# Patient Record
Sex: FEMALE | Race: WHITE | NOT HISPANIC OR LATINO
[De-identification: names, ages, dates, MRNs, and addresses within clinical notes are randomized per-mention and may not be internally consistent; named-entity substitution may affect disease eponyms.]

---

## 2017-04-06 ENCOUNTER — APPOINTMENT (OUTPATIENT)
Dept: CARDIOLOGY | Facility: CLINIC | Age: 62
End: 2017-04-06

## 2017-04-14 ENCOUNTER — APPOINTMENT (OUTPATIENT)
Dept: CARDIOLOGY | Facility: CLINIC | Age: 62
End: 2017-04-14

## 2017-05-04 ENCOUNTER — RESULT REVIEW (OUTPATIENT)
Age: 62
End: 2017-05-04

## 2017-10-08 ENCOUNTER — TRANSCRIPTION ENCOUNTER (OUTPATIENT)
Age: 62
End: 2017-10-08

## 2017-10-27 ENCOUNTER — APPOINTMENT (OUTPATIENT)
Dept: CARDIOLOGY | Facility: CLINIC | Age: 62
End: 2017-10-27
Payer: COMMERCIAL

## 2017-10-27 PROCEDURE — 99214 OFFICE O/P EST MOD 30 MIN: CPT

## 2017-11-20 ENCOUNTER — APPOINTMENT (OUTPATIENT)
Dept: ORTHOPEDIC SURGERY | Facility: CLINIC | Age: 62
End: 2017-11-20
Payer: COMMERCIAL

## 2017-11-20 VITALS
SYSTOLIC BLOOD PRESSURE: 153 MMHG | HEART RATE: 60 BPM | BODY MASS INDEX: 19.99 KG/M2 | HEIGHT: 65 IN | WEIGHT: 120 LBS | DIASTOLIC BLOOD PRESSURE: 76 MMHG

## 2017-11-20 DIAGNOSIS — Z80.9 FAMILY HISTORY OF MALIGNANT NEOPLASM, UNSPECIFIED: ICD-10-CM

## 2017-11-20 PROCEDURE — 99203 OFFICE O/P NEW LOW 30 MIN: CPT

## 2017-11-20 PROCEDURE — 73140 X-RAY EXAM OF FINGER(S): CPT | Mod: 26,F1

## 2018-04-03 ENCOUNTER — APPOINTMENT (OUTPATIENT)
Dept: CARDIOLOGY | Facility: CLINIC | Age: 63
End: 2018-04-03
Payer: MEDICARE

## 2018-04-05 ENCOUNTER — APPOINTMENT (OUTPATIENT)
Dept: CARDIOLOGY | Facility: CLINIC | Age: 63
End: 2018-04-05
Payer: MEDICARE

## 2018-04-05 PROCEDURE — 93306 TTE W/DOPPLER COMPLETE: CPT

## 2018-04-05 PROCEDURE — 93880 EXTRACRANIAL BILAT STUDY: CPT

## 2018-04-06 PROCEDURE — 93224 XTRNL ECG REC UP TO 48 HRS: CPT

## 2018-04-17 ENCOUNTER — APPOINTMENT (OUTPATIENT)
Dept: CARDIOLOGY | Facility: CLINIC | Age: 63
End: 2018-04-17
Payer: MEDICARE

## 2018-04-17 ENCOUNTER — RECORD ABSTRACTING (OUTPATIENT)
Age: 63
End: 2018-04-17

## 2018-04-17 VITALS
HEART RATE: 58 BPM | SYSTOLIC BLOOD PRESSURE: 122 MMHG | OXYGEN SATURATION: 98 % | BODY MASS INDEX: 19.66 KG/M2 | WEIGHT: 118 LBS | DIASTOLIC BLOOD PRESSURE: 62 MMHG | HEIGHT: 65 IN

## 2018-04-17 DIAGNOSIS — Z87.39 PERSONAL HISTORY OF OTHER DISEASES OF THE MUSCULOSKELETAL SYSTEM AND CONNECTIVE TISSUE: ICD-10-CM

## 2018-04-17 DIAGNOSIS — Z87.09 PERSONAL HISTORY OF OTHER DISEASES OF THE RESPIRATORY SYSTEM: ICD-10-CM

## 2018-04-17 DIAGNOSIS — Z85.828 PERSONAL HISTORY OF OTHER MALIGNANT NEOPLASM OF SKIN: ICD-10-CM

## 2018-04-17 PROCEDURE — 99214 OFFICE O/P EST MOD 30 MIN: CPT

## 2018-04-17 PROCEDURE — 93000 ELECTROCARDIOGRAM COMPLETE: CPT

## 2018-05-07 ENCOUNTER — RESULT REVIEW (OUTPATIENT)
Age: 63
End: 2018-05-07

## 2018-10-19 ENCOUNTER — RECORD ABSTRACTING (OUTPATIENT)
Age: 63
End: 2018-10-19

## 2018-10-23 ENCOUNTER — APPOINTMENT (OUTPATIENT)
Dept: CARDIOLOGY | Facility: CLINIC | Age: 63
End: 2018-10-23
Payer: MEDICARE

## 2018-10-23 VITALS
WEIGHT: 118 LBS | HEIGHT: 65 IN | HEART RATE: 63 BPM | DIASTOLIC BLOOD PRESSURE: 62 MMHG | BODY MASS INDEX: 19.66 KG/M2 | SYSTOLIC BLOOD PRESSURE: 110 MMHG | OXYGEN SATURATION: 98 %

## 2018-10-23 PROCEDURE — 99214 OFFICE O/P EST MOD 30 MIN: CPT

## 2018-11-06 ENCOUNTER — APPOINTMENT (OUTPATIENT)
Dept: CARDIOLOGY | Facility: CLINIC | Age: 63
End: 2018-11-06
Payer: MEDICARE

## 2018-11-06 PROCEDURE — 93351 STRESS TTE COMPLETE: CPT

## 2018-11-13 ENCOUNTER — RECORD ABSTRACTING (OUTPATIENT)
Age: 63
End: 2018-11-13

## 2018-11-14 ENCOUNTER — APPOINTMENT (OUTPATIENT)
Dept: CARDIOLOGY | Facility: CLINIC | Age: 63
End: 2018-11-14
Payer: MEDICARE

## 2018-11-14 VITALS
BODY MASS INDEX: 19.99 KG/M2 | HEIGHT: 65 IN | OXYGEN SATURATION: 99 % | WEIGHT: 120 LBS | DIASTOLIC BLOOD PRESSURE: 60 MMHG | SYSTOLIC BLOOD PRESSURE: 130 MMHG | HEART RATE: 59 BPM

## 2018-11-14 PROCEDURE — 99214 OFFICE O/P EST MOD 30 MIN: CPT

## 2019-04-01 ENCOUNTER — RESULT CHARGE (OUTPATIENT)
Age: 64
End: 2019-04-01

## 2019-04-11 ENCOUNTER — APPOINTMENT (OUTPATIENT)
Dept: CARDIOLOGY | Facility: CLINIC | Age: 64
End: 2019-04-11
Payer: MEDICARE

## 2019-04-11 PROCEDURE — 93880 EXTRACRANIAL BILAT STUDY: CPT

## 2019-04-11 PROCEDURE — 93306 TTE W/DOPPLER COMPLETE: CPT

## 2019-04-16 PROCEDURE — 93224 XTRNL ECG REC UP TO 48 HRS: CPT

## 2019-04-17 ENCOUNTER — APPOINTMENT (OUTPATIENT)
Dept: CARDIOLOGY | Facility: CLINIC | Age: 64
End: 2019-04-17
Payer: MEDICARE

## 2019-04-17 ENCOUNTER — RECORD ABSTRACTING (OUTPATIENT)
Age: 64
End: 2019-04-17

## 2019-04-17 PROCEDURE — 93351 STRESS TTE COMPLETE: CPT

## 2019-04-22 ENCOUNTER — TRANSCRIPTION ENCOUNTER (OUTPATIENT)
Age: 64
End: 2019-04-22

## 2019-04-24 ENCOUNTER — RECORD ABSTRACTING (OUTPATIENT)
Age: 64
End: 2019-04-24

## 2019-04-24 ENCOUNTER — APPOINTMENT (OUTPATIENT)
Dept: CARDIOLOGY | Facility: CLINIC | Age: 64
End: 2019-04-24
Payer: MEDICARE

## 2019-04-24 VITALS
DIASTOLIC BLOOD PRESSURE: 70 MMHG | SYSTOLIC BLOOD PRESSURE: 112 MMHG | WEIGHT: 118 LBS | BODY MASS INDEX: 19.66 KG/M2 | HEIGHT: 65 IN | HEART RATE: 75 BPM | OXYGEN SATURATION: 95 %

## 2019-04-24 DIAGNOSIS — Q80.9 CONGENITAL ICHTHYOSIS, UNSPECIFIED: ICD-10-CM

## 2019-04-24 DIAGNOSIS — M67.442 GANGLION, LEFT HAND: ICD-10-CM

## 2019-04-24 DIAGNOSIS — R07.89 OTHER CHEST PAIN: ICD-10-CM

## 2019-04-24 PROCEDURE — 99214 OFFICE O/P EST MOD 30 MIN: CPT

## 2019-06-14 ENCOUNTER — EMERGENCY (EMERGENCY)
Facility: HOSPITAL | Age: 64
LOS: 1 days | End: 2019-06-14
Payer: MEDICARE

## 2019-06-14 ENCOUNTER — TRANSCRIPTION ENCOUNTER (OUTPATIENT)
Age: 64
End: 2019-06-14

## 2019-06-14 ENCOUNTER — OUTPATIENT (OUTPATIENT)
Dept: OUTPATIENT SERVICES | Facility: HOSPITAL | Age: 64
LOS: 1 days | End: 2019-06-14

## 2019-06-14 PROCEDURE — 70450 CT HEAD/BRAIN W/O DYE: CPT | Mod: 26

## 2019-06-14 PROCEDURE — 99285 EMERGENCY DEPT VISIT HI MDM: CPT

## 2019-06-14 PROCEDURE — 93010 ELECTROCARDIOGRAM REPORT: CPT

## 2019-06-14 PROCEDURE — 71046 X-RAY EXAM CHEST 2 VIEWS: CPT | Mod: 26

## 2019-06-14 PROCEDURE — 73564 X-RAY EXAM KNEE 4 OR MORE: CPT | Mod: 26,LT

## 2019-06-14 PROCEDURE — 99284 EMERGENCY DEPT VISIT MOD MDM: CPT

## 2019-06-14 PROCEDURE — 72125 CT NECK SPINE W/O DYE: CPT | Mod: 26

## 2019-06-15 ENCOUNTER — OUTPATIENT (OUTPATIENT)
Dept: OUTPATIENT SERVICES | Facility: HOSPITAL | Age: 64
LOS: 1 days | End: 2019-06-15

## 2019-06-18 ENCOUNTER — APPOINTMENT (OUTPATIENT)
Dept: CARDIOLOGY | Facility: CLINIC | Age: 64
End: 2019-06-18
Payer: MEDICARE

## 2019-06-18 ENCOUNTER — NON-APPOINTMENT (OUTPATIENT)
Age: 64
End: 2019-06-18

## 2019-06-18 VITALS
DIASTOLIC BLOOD PRESSURE: 70 MMHG | HEART RATE: 70 BPM | WEIGHT: 120 LBS | BODY MASS INDEX: 20.49 KG/M2 | OXYGEN SATURATION: 98 % | HEIGHT: 64 IN | SYSTOLIC BLOOD PRESSURE: 134 MMHG

## 2019-06-18 PROCEDURE — 99214 OFFICE O/P EST MOD 30 MIN: CPT

## 2019-06-18 PROCEDURE — 93000 ELECTROCARDIOGRAM COMPLETE: CPT | Mod: 59

## 2019-06-18 PROCEDURE — 0296T: CPT | Mod: 59

## 2019-06-18 NOTE — REVIEW OF SYSTEMS
[Palpitations] : palpitations [Shortness Of Breath] : no shortness of breath [Chest Pain] : no chest pain [Dizziness] : no dizziness

## 2019-06-18 NOTE — REASON FOR VISIT
[FreeTextEntry1] : Mrs. Ngo is a 64 year old female that came in today 6-18-19 for hospital f/u. \par She was last seen 4-24-19. \par \par She states on 6-14-19 after exercising on the treadmill x 30minutes she had an episode of a loss of consciousness x a couple of seconds. She does not remember the actual episode of falling. She did not have any prodrome symptoms prior.  \par She was evaluated at [facility] [place] (with no significant findings per patient neg head CT, awaiting records). She did injur her chin (needing stitches)Follow up outpatient was advised to r/o arrhythmia. \par \par She has been exercising x 3 hours a day x 5 days a week for the past 4.5 years. She admits to not hydrating well, which is not unusual for her. \par \par She has a history of transient palpitation,  x years improving over the years (no associated symptoms) and hyperlipidemia. \par She denies any chest pain, sob, dizziness, orthopnea and PND. \par \par Labs/tests\par \par echocardiogram 4-11-19 EF 60% with normal ventricular function, mild MR, mild dilated left atrium, minimal TR minimal Pr. \par \par Stress echocardiogram 4-17-19 patient is 10 minutes reaching 94% of MPHR, revealing normal electrocardiographic response and normal augmentation in left ventricular systolic function\par \par Holter 4-11-19 normal sinus rhythm rate  beats per minute averaging 73bpm with rare to occasional APCs and rare sequential 2-6 beats up to 179bpm, multifocal and frequent  PVCs with rare couplets and a 3 beat ventricular tachycardia noted. No symptoms\par Carotid ultrasound 4-11-19 normal study\par \par Labs 4-17-19 , HDL 69, triglycerides 85\par \par Labs 6-14-19 white blood cell 10, hemoglobin 13.8, hematocrit 40.9, platelets 331, sodium 139 potassium 3.8, BUN 13, creatinine 0.6, magnesium 1.7, AST 18, , troponin <0.010\par \par Knee x-ray 6-14-19 no acute fracture\par \par Chest x-ry 6-14-19 negative study\par \par CT brain without contrast 6-14-19 no evidence of acute intracranial process. \par \par CT spine without contrast 6-14-19 no fracture or subluxation, multilevel degenerative changes posterior disc osteophyte complexes at C4-C5, C5-C6 and C6-C7 which appears was moderate severe spinal canal stenosis\par \par

## 2019-06-18 NOTE — PHYSICAL EXAM
[Normal Appearance] : normal appearance [No Deformities] : no deformities [] : no respiratory distress [Respiration, Rhythm And Depth] : normal respiratory rhythm and effort [Exaggerated Use Of Accessory Muscles For Inspiration] : no accessory muscle use [Heart Rate And Rhythm] : heart rate and rhythm were normal [Heart Sounds] : normal S1 and S2 [Bowel Sounds] : normal bowel sounds [Abdomen Soft] : soft [Abdomen Tenderness] : non-tender [Abnormal Walk] : normal gait [Skin Color & Pigmentation] : normal skin color and pigmentation [Affect] : the affect was normal [Mood] : the mood was normal [FreeTextEntry1] : no edema with pulses noted b/l

## 2019-06-18 NOTE — ASSESSMENT
[FreeTextEntry1] : PLAN 6-18-19\par \par --Syncope. Per patient, no significant findings during hospital visit (requesting records). Echocardiogram 4-11-19 60% with normal ventricular function. Stress echocardiogram 4-17-19, she exercised 10 minutes reaching 94% of MPHR with normal augmentation of LV systolic function. Holter monitor 4-11-19 heart rate  averaging 73bpm with rare to occasional APCs, multifocal and frequent PVCs and 3 beat ventricular tachycardia. Labs as above. We have recommended 2-3 week event monitoring Zio patch for further evaluation of any underlying arrythmias. If any addiotional episodes 911/er and consider loop monitoring. Patient to increase hydration. (requesting labs from ER visit). \par \par -ASCVD risk 6.35%. Discuss at next visit. Patient should follow a strict low trans sat fat diet. \par \par -Sutures on chin. Patient to wait at least 5-7 days before removal. Today is day 4. She will come back for removal this week\par \par -PCP. PAP may 2018 (was told Q 2 years), mammogram 2 weeks ago (followed by gyn), Dexa pt declines any treatment. Patient has a followup within the next 2 months with a new primary care doctor, Dr. Elizalde.\par \par f/u in a couple of days for suture removal and after zeo patch, sooner if changes in symptoms or status. \par \par Sincerely,\par Dr. Valentino\par scribed by Rama Ramos PA-C\par \par

## 2019-06-20 ENCOUNTER — APPOINTMENT (OUTPATIENT)
Dept: CARDIOLOGY | Facility: CLINIC | Age: 64
End: 2019-06-20
Payer: MEDICARE

## 2019-06-20 VITALS
BODY MASS INDEX: 19.99 KG/M2 | HEART RATE: 54 BPM | SYSTOLIC BLOOD PRESSURE: 110 MMHG | HEIGHT: 65 IN | WEIGHT: 120 LBS | DIASTOLIC BLOOD PRESSURE: 64 MMHG | OXYGEN SATURATION: 100 %

## 2019-06-20 DIAGNOSIS — Z48.02 ENCOUNTER FOR REMOVAL OF SUTURES: ICD-10-CM

## 2019-06-20 PROCEDURE — S0630: CPT

## 2019-06-20 PROCEDURE — 99214 OFFICE O/P EST MOD 30 MIN: CPT

## 2019-06-25 ENCOUNTER — OTHER (OUTPATIENT)
Age: 64
End: 2019-06-25

## 2019-06-25 NOTE — REASON FOR VISIT
[FreeTextEntry1] : Mrs. Ngo is a 64 year old female that came in today 6-20-19 for suture removal. \par She was last seen 6-18-19 in hospital follow up.  \par \par As mentioned previously, on 6-14-19 after exercising on the treadmill x 30minutes she had an episode of a loss of consciousness x a couple of seconds. She does not remember the actual episode of falling. She did not have any prodrome symptoms prior.  She did admit to not hydrating well.\par She was evaluated at [facility] [place] (with no significant findings per patient neg head CT, still awaiting records). She did injure her chin (needing stitches)Follow up outpatient was advised to r/o arrhythmia. \par \par She has exercised since our last visit and has felt fine (note; she has been exercising x 3 hours a day x 5 days a week for the past 4.5 years).  \par \par As mentioned previously she has a history of transient palpitations,  x years improving over the years (no associated symptoms) and hyperlipidemia. \par \par She continues to deny any chest pain, sob, dizziness, orthopnea and PND. No additional syncopal episodes since past visit.  \par \par Labs/tests\par \par echocardiogram 4-11-19 EF 60% with normal ventricular function, mild MR, mild dilated left atrium, minimal TR minimal Pr. \par \par Stress echocardiogram 4-17-19 patient is 10 minutes reaching 94% of MPHR, revealing normal electrocardiographic response and normal augmentation in left ventricular systolic function\par \par Holter 4-11-19 normal sinus rhythm rate  beats per minute averaging 73bpm with rare to occasional APCs and rare sequential 2-6 beats up to 179bpm, multifocal and frequent  PVCs with rare couplets and a 3 beat ventricular tachycardia noted. No symptoms\par Carotid ultrasound 4-11-19 normal study\par \par Labs 4-17-19 , HDL 69, triglycerides 85\par \par Labs 6-14-19 white blood cell 10, hemoglobin 13.8, hematocrit 40.9, platelets 331, sodium 139 potassium 3.8, BUN 13, creatinine 0.6, magnesium 1.7, AST 18, , troponin <0.010\par \par Knee x-ray 6-14-19 no acute fracture\par \par Chest x-ry 6-14-19 negative study\par \par CT brain without contrast 6-14-19 no evidence of acute intracranial process. \par \par CT spine without contrast 6-14-19 no fracture or subluxation, multilevel degenerative changes posterior disc osteophyte complexes at C4-C5, C5-C6 and C6-C7 which appears was moderate severe spinal canal stenosis\par \par

## 2019-06-25 NOTE — ASSESSMENT
[FreeTextEntry1] : PLAN 6-20-19\par \par -Sutures on chin. Patient to wait at least 5-7 days before removal. Today is day 6. 6 stiches removed. Healing well. \par \par --Syncope. Per patient, no significant findings during hospital visit (awaiting discharge summary). Echocardiogram 4-11-19 60% with normal ventricular function. Stress echocardiogram 4-17-19, she exercised 10 minutes reaching 94% of MPHR with normal augmentation of LV systolic function. Holter monitor 4-11-19 heart rate  averaging 73bpm with rare to occasional APCs, multifocal and frequent PVCs and 3 beat ventricular tachycardia. Labs as above. Patient has Zio patch in place for further evaluation of any underlying arrythmias. If any additional episodes 911/er and consider loop monitoring. Patient to increase hydration. (requested labs from ER visit). \par \par -ASCVD risk 6.35%. Given risk, without any family hx or known CAD we discussed the role of calcium scoring in determining treatment plan with statins. Patient is in agreement, rx given. Patient should continue to follow a strict low trans sat fat diet. \par \par -PCP. PAP may 2018 (was told Q 2 years), mammogram 2 weeks ago (followed by gyn), Dexa pt declines any treatment. Patient has a followup within the next 2 months with a new primary care doctor, Dr. Elizalde.\par \par f/u after zeo patch, sooner if changes in symptoms or status. \par \par Sincerely,\par  Rama Ramos PA-C\par Patient seen and plan instructed by supervising physician Dr. Valentino\par \par

## 2019-06-25 NOTE — ADDENDUM
[FreeTextEntry1] : append\par \par pt case came up in conversation with Dr. Roman, of whom saw pt in hospital\par He is concerned for anomolous coronary with her h/o syncope during exercise (without prodrome)\par we have ordered a CTA of the coronaries with contrast in addition to a limited echo (cancelled calcium score)\par \par PV is aware. PT will have done prior to next visit. She verbalizes understanding of avoiding exercise at this time--cv\par \par If any changes on RV consider cardiac MRI. \par In f/u discuss option of loop monitoring, pending results as above.

## 2019-06-27 ENCOUNTER — APPOINTMENT (OUTPATIENT)
Dept: CARDIOLOGY | Facility: CLINIC | Age: 64
End: 2019-06-27
Payer: MEDICARE

## 2019-06-27 PROCEDURE — 93306 TTE W/DOPPLER COMPLETE: CPT

## 2019-07-10 PROCEDURE — 0298T: CPT

## 2019-07-16 ENCOUNTER — APPOINTMENT (OUTPATIENT)
Dept: CARDIOLOGY | Facility: CLINIC | Age: 64
End: 2019-07-16
Payer: MEDICARE

## 2019-07-16 ENCOUNTER — RECORD ABSTRACTING (OUTPATIENT)
Age: 64
End: 2019-07-16

## 2019-07-16 VITALS
WEIGHT: 119 LBS | HEART RATE: 81 BPM | OXYGEN SATURATION: 97 % | DIASTOLIC BLOOD PRESSURE: 60 MMHG | HEIGHT: 65 IN | BODY MASS INDEX: 19.83 KG/M2 | SYSTOLIC BLOOD PRESSURE: 120 MMHG

## 2019-07-16 PROCEDURE — 99214 OFFICE O/P EST MOD 30 MIN: CPT

## 2019-07-16 NOTE — PHYSICAL EXAM
[Normal Appearance] : normal appearance [No Deformities] : no deformities [] : no respiratory distress [Respiration, Rhythm And Depth] : normal respiratory rhythm and effort [Exaggerated Use Of Accessory Muscles For Inspiration] : no accessory muscle use [Heart Sounds] : normal S1 and S2 [Bowel Sounds] : normal bowel sounds [Abdomen Soft] : soft [Abnormal Walk] : normal gait [Skin Color & Pigmentation] : normal skin color and pigmentation [Affect] : the affect was normal [Mood] : the mood was normal [FreeTextEntry1] : no sign edema

## 2019-07-16 NOTE — ADDENDUM
[FreeTextEntry1] : Please note the patient was reviewed with ANJELICA Jones.\par I was physically present during the service of the patient\pattie I was directly involved in the management plan and recommendations of care provided to the patient. \par I personally reviewed the history and physical exam and examination as documented by the PA above.\par 07/16/2019\par \par EP eval of ex syncope

## 2019-07-16 NOTE — ASSESSMENT
[FreeTextEntry1] : AYAD JARAMILLO  is a 64 year F  who presents today Jul 16, 2019 with the above history and the following active issues. \par \par Syncope. Per patient, no significant findings during hospital visit. Echocardiogram 4-11-19 60% with normal ventricular function. Stress echocardiogram 4-17-19, she exercised 10 minutes reaching 94% of MPHR with normal augmentation of LV systolic function. Holter monitor 4-11-19 heart rate  averaging 73bpm with rare to occasional APCs, multifocal and frequent PVCs and 3 beat ventricular tachycardia. Labs as above. CTA of the coronary arteries demonstrates no evidence of coronary artery disease and no evidence of anomalous coronary artery.  ZIO patch monitor with a 4 beat run of NSVT and short run of atrial tachycardia.  Recommend electrophysiology evaluation. If any additional episodes 911/er and consider loop monitoring. Patient to increase hydration. (requested labs from ER visit). \par Continue to avoid strenuous exercise\par \par -ASCVD risk 6.35%.  Patient should continue to follow a strict low trans sat fat diet. Calcium score 0\par \par \par Red flag symptoms which would warrant sooner emergent evaluation reviewed with the patient. \par Questions and concerns were addressed and answered. \par \par Sincerely,\par \par Eryn Jones PA-C\par Patients history, testing and plan reviewed with supervising MD: Dr. Gavin Roman \par \par

## 2019-07-29 ENCOUNTER — APPOINTMENT (OUTPATIENT)
Dept: CARDIOLOGY | Facility: CLINIC | Age: 64
End: 2019-07-29

## 2019-07-31 ENCOUNTER — APPOINTMENT (OUTPATIENT)
Dept: ELECTROPHYSIOLOGY | Facility: CLINIC | Age: 64
End: 2019-07-31
Payer: MEDICARE

## 2019-07-31 ENCOUNTER — NON-APPOINTMENT (OUTPATIENT)
Age: 64
End: 2019-07-31

## 2019-07-31 VITALS
WEIGHT: 120 LBS | HEIGHT: 65 IN | HEART RATE: 82 BPM | OXYGEN SATURATION: 98 % | BODY MASS INDEX: 19.99 KG/M2 | SYSTOLIC BLOOD PRESSURE: 142 MMHG | DIASTOLIC BLOOD PRESSURE: 70 MMHG

## 2019-07-31 PROCEDURE — 93000 ELECTROCARDIOGRAM COMPLETE: CPT

## 2019-07-31 PROCEDURE — 99205 OFFICE O/P NEW HI 60 MIN: CPT | Mod: 25

## 2019-07-31 NOTE — HISTORY OF PRESENT ILLNESS
[FreeTextEntry1] : \par \par Dear Dr. Taylor:  I had a pleasure seeing your patient in consultation regarding a prior syncope episode. As you know she is a 64-year-old woman who is very active exercises almost daily has been in good health and has had no significant prior medical history. She has had no prior history of hypertension, diabetes prior heart disease lung disease. There is a questionable history of asthma when she was younger but never had any problems. She has had a history of GE reflux.\par \par The patient was exercising on  June 14, 2019 treadmill for 30 minutes ( usual time - at 4.5 mph) she had an episode of loss of consciousness which was described as a few seconds - she is not actually does not remember the episode and did not seem to have any prodrome. She went to urgent care center and was then referred to the hospital. The patient required stitches in the chin area. She had a negative head CT scan\par \par She has had evaluation which included an echocardiogram performed in June that showed normal ventricle function ejection fraction approximately 60% mild mitral regurgitation,  normal right ventricle function.\par \par She also had a CT of the coronary arteries that did not show any coronary disease. There is no evidence of anomalous coronary circulation.\par \par The patient had a ZIO patch placed for 13 days start an 6/18-7/2/19. It showed that she had  4 beats of a  wide complex tachycardia at 167bpm. \par She also had 4 beats slower tachycardia at 120 beats per minutes suggestive of ventricular. He had occasional bradycardia to 40 beats a minute range ( while sleeping) and  occasional sinus tachycardia at 135 beats per minute.\par \par The patient is a prior cervical spinal canal  stenosis . She is also prior hip surgery left side. \par \par She has no current symptoms no chest pain, dizziness and lightheadedness syncope recurrence, palpitations. She is drinking plenty of fluids now but was not drinking enough fluids prior to the event.

## 2019-07-31 NOTE — PHYSICAL EXAM
[Normal Conjunctiva] : the conjunctiva exhibited no abnormalities [General Appearance - In No Acute Distress] : no acute distress [General Appearance - Well Developed] : well developed [Heart Rate And Rhythm] : heart rate and rhythm were normal [No Oral Pallor] : no oral pallor [Normal Jugular Venous V Waves Present] : normal jugular venous V waves present [Heart Sounds] : normal S1 and S2 [Respiration, Rhythm And Depth] : normal respiratory rhythm and effort [Murmurs] : no murmurs present [Abdomen Soft] : soft [Abdomen Tenderness] : non-tender [Auscultation Breath Sounds / Voice Sounds] : lungs were clear to auscultation bilaterally [Abnormal Walk] : normal gait [Nail Clubbing] : no clubbing of the fingernails [Cyanosis, Localized] : no localized cyanosis [Impaired Insight] : insight and judgment were intact [No Venous Stasis] : no venous stasis

## 2019-07-31 NOTE — DISCUSSION/SUMMARY
[FreeTextEntry1] : The patient had syncope there is unexplained and this occurred while she was in treadmill exercise and in she had no prodrome and was not had no illness preceded.\par \par Coronary CTA showed normal coronary arteries with no anomalous circulation. Her echo showed normal ventricular function.\par \par Of note on the monitor showed 4 beats wide-complex tachycardia that could be ventricular .\par \par It is unclear to me what exactly occurred during her syncopal episode in treadmill. It could have been arrhythmic but also may have been related to blood pressure.\par \par  Because of concern that this could have been an arrhythmia I recommendation would be to consider a more long-term monitor such as an implantable monitor. Should she have recurrent symptoms this would be helpful. She seems reluctant but will call the office when she decides. She was instructed to increased fluids and avoid strenous activity.

## 2019-08-05 ENCOUNTER — OUTPATIENT (OUTPATIENT)
Dept: OUTPATIENT SERVICES | Facility: HOSPITAL | Age: 64
LOS: 1 days | End: 2019-08-05
Payer: MEDICARE

## 2019-08-07 PROCEDURE — 33285 INSJ SUBQ CAR RHYTHM MNTR: CPT

## 2019-08-13 ENCOUNTER — APPOINTMENT (OUTPATIENT)
Dept: CARDIOLOGY | Facility: CLINIC | Age: 64
End: 2019-08-13
Payer: MEDICARE

## 2019-08-13 PROCEDURE — 93285 PRGRMG DEV EVAL SCRMS IP: CPT

## 2019-09-02 NOTE — HISTORY OF PRESENT ILLNESS
[de-identified] : On 6-14-19 after exercising on the treadmill x 30minutes she had an episode of syncope for a couple of seconds. She does not remember the actual episode of falling. She did not have any prodrome symptoms prior. She did admit to not hydrating well.  She did injure her chin requiring stitches.  \par She was evaluated at [facility] [place] (with no significant findings per patient neg head CT, still awaiting records). Follow up outpatient was advised to r/o arrhythmia. \par \par She has exercised since our last visit and has felt fine (note; she has been exercising x 3 hours a day x 5 days a week for the past 4.5 years). \par \par As mentioned previously she has a history of transient palpitations, x years improving over the years (no associated symptoms) and hyperlipidemia.\par \par Evaluated by EP ( Dr. Jay) who recommended implantation of loop recorder.   Presents today for followup.  No further syncopal episodes.

## 2019-09-02 NOTE — PROCEDURE
[See Device Printout] : See device printout [de-identified] : Reveal Linq LNQ11 [de-identified] : [employer] [de-identified] : [id number] [de-identified] : 08/07/2019 [de-identified] : No episodes recorded.  \par Pt has bedside monitor.  She has been enrolled in [employer] website.  Reviewed device usage. \par 32 day summary will be scheduled. \par Wound CDI. [de-identified] : Battery status: Good

## 2019-09-17 ENCOUNTER — APPOINTMENT (OUTPATIENT)
Dept: CARDIOLOGY | Facility: CLINIC | Age: 64
End: 2019-09-17
Payer: MEDICARE

## 2019-09-17 PROCEDURE — 93299: CPT

## 2019-09-17 PROCEDURE — 93298 REM INTERROG DEV EVAL SCRMS: CPT

## 2019-09-18 NOTE — ASSESSMENT
[FreeTextEntry1] : Routine loop recorder remote transmission reviewed and revealed no events. No changes at this time. F/U remote in 32 days. DEACON.

## 2019-10-16 ENCOUNTER — APPOINTMENT (OUTPATIENT)
Dept: CARDIOLOGY | Facility: CLINIC | Age: 64
End: 2019-10-16

## 2019-10-17 ENCOUNTER — OUTPATIENT (OUTPATIENT)
Dept: OUTPATIENT SERVICES | Facility: HOSPITAL | Age: 64
LOS: 1 days | End: 2019-10-17

## 2019-10-22 ENCOUNTER — APPOINTMENT (OUTPATIENT)
Dept: CARDIOLOGY | Facility: CLINIC | Age: 64
End: 2019-10-22
Payer: MEDICARE

## 2019-10-22 PROCEDURE — 93298 REM INTERROG DEV EVAL SCRMS: CPT

## 2019-10-22 PROCEDURE — 93299: CPT

## 2019-10-22 NOTE — ASSESSMENT
[FreeTextEntry1] : Remote LNq Summary\par \par 10-22-19 \par Viewed lnq summary 8-7-19 to 9-7-19.\par No events were noted.\par  Next summary on pa schedule 32days--cv

## 2019-11-26 ENCOUNTER — APPOINTMENT (OUTPATIENT)
Dept: CARDIOLOGY | Facility: CLINIC | Age: 64
End: 2019-11-26
Payer: MEDICARE

## 2019-11-26 PROCEDURE — 93298 REM INTERROG DEV EVAL SCRMS: CPT

## 2019-11-26 PROCEDURE — 93299: CPT

## 2019-11-26 NOTE — ASSESSMENT
[FreeTextEntry1] : Remote LNq Summary\par \par \par 11-26-19 \par Viewed lnq summary 9-7-19 to 10-8-19. \par No events were noted.\par  Next summary on pa schedule 32days-cv

## 2019-11-27 PROBLEM — Q80.9 ICHTHYOSIS: Status: ACTIVE | Noted: 2018-10-23

## 2020-01-03 ENCOUNTER — APPOINTMENT (OUTPATIENT)
Dept: CARDIOLOGY | Facility: CLINIC | Age: 65
End: 2020-01-03
Payer: MEDICARE

## 2020-01-03 PROCEDURE — 93298 REM INTERROG DEV EVAL SCRMS: CPT

## 2020-01-03 PROCEDURE — G2066: CPT

## 2020-01-06 NOTE — ASSESSMENT
[FreeTextEntry1] : Remote LNq Summary\par \par 1-3-20\par Viewed lnq summary 10-8-19 to 11-8-19. \par No events were noted. \par Next summary on pa schedule 32days-cv

## 2020-02-07 ENCOUNTER — APPOINTMENT (OUTPATIENT)
Dept: CARDIOLOGY | Facility: CLINIC | Age: 65
End: 2020-02-07
Payer: MEDICARE

## 2020-02-07 PROCEDURE — 93298 REM INTERROG DEV EVAL SCRMS: CPT

## 2020-02-07 PROCEDURE — G2066: CPT

## 2020-02-07 NOTE — ASSESSMENT
[FreeTextEntry1] : Remote LNq summary\par \par 2-7-20 \par Viewed lnq summary 11-8-19 to 12-9-19. \par No events were noted. \par Next summary on pa schedule 32days-cv

## 2020-03-09 ENCOUNTER — APPOINTMENT (OUTPATIENT)
Dept: CARDIOLOGY | Facility: CLINIC | Age: 65
End: 2020-03-09
Payer: MEDICARE

## 2020-03-09 DIAGNOSIS — Z87.898 PERSONAL HISTORY OF OTHER SPECIFIED CONDITIONS: ICD-10-CM

## 2020-03-09 PROCEDURE — G2066: CPT

## 2020-03-09 PROCEDURE — 93298 REM INTERROG DEV EVAL SCRMS: CPT

## 2020-03-09 NOTE — ASSESSMENT
[FreeTextEntry1] : Remote Lnq Summary\par \par 3-9-20 \par Viewed lnq summary 12-9-19 to 1-9-19. \par No events were noted. \par Next summary on pa schedule 32days-cv

## 2020-03-11 ENCOUNTER — APPOINTMENT (OUTPATIENT)
Dept: CARDIOLOGY | Facility: CLINIC | Age: 65
End: 2020-03-11
Payer: MEDICARE

## 2020-03-11 VITALS
HEIGHT: 65 IN | BODY MASS INDEX: 19.99 KG/M2 | WEIGHT: 120 LBS | OXYGEN SATURATION: 98 % | DIASTOLIC BLOOD PRESSURE: 60 MMHG | SYSTOLIC BLOOD PRESSURE: 110 MMHG | HEART RATE: 68 BPM

## 2020-03-11 PROCEDURE — 99214 OFFICE O/P EST MOD 30 MIN: CPT

## 2020-03-11 RX ORDER — VIT C/E/ZN/COPPR/LUTEIN/ZEAXAN 250MG-90MG
CAPSULE ORAL DAILY
Refills: 0 | Status: ACTIVE | COMMUNITY

## 2020-03-11 RX ORDER — TURMERIC ROOT EXTRACT 500 MG
TABLET ORAL
Refills: 0 | Status: DISCONTINUED | COMMUNITY
End: 2020-03-11

## 2020-03-12 NOTE — HISTORY OF PRESENT ILLNESS
[FreeTextEntry1] : AYAD JARAMILLO  is a 65 year old  F\par \par History of premature beats, arthritis, NSVT, mild VHD\par There is no prior history of a clinical myocardial infarction, coronary revascularization. \par There is no history of symptomatic congestive heart failure rheumatic heart disease or atrial fibrillation.\par \par Previously seen for episode of syncope.  She wass working out on the treadmill at the gym and the next thing she knew she was on the floor.  There was brief transient loss of consciousness.  She had repair of laceration and sent to the emergency room.  Seen in [facility] June 2019\par There was no reported prodrome.  \par Specifically no symptoms of palpitations lightheadedness or nausea.  \par There was no tongue biting or incontinence.  \par She was not confused afterwards.  \par There were no new medications or clear-cut precipitating factors.  \par Ruled out for myocardial infarction.  \par At follow-up visit longer-term monitoring and non invasive testing was recommended.\par \par Baseline active, no limitations\par There is no exertional chest pain, pressure or discomfort. \par There is no significant dyspnea on exertion or orthopnea. \par There are no symptomatic palpitations.\par \par Prior Holter monitor from April 2019 describes PVCs with NSVT to 3 beats \par Echocardiogram April 2019  normal left ventricular function mild mitral regurgitation \par Stress echo April 2019 no ischemia\par Carotid Doppler normal study \par Blood work April 2019 total cholesterol 209 with LDL of 122 hemoglobin 12.5 \par EKG demonstrates sinus rhythm with poor R wave progression \par Cardiac CTA with 0 calcium score there is no significant atherosclerosis there was aortic atherosclerosis\par Zio Patch demonstrated NSVT to 4 beats there were no sustained dysrhythmias underwent implantable loop recorder August 2019 \par Loop recorder interrogations have not demonstrated any clinical events\par

## 2020-03-12 NOTE — ASSESSMENT
[FreeTextEntry1] : Syncope, trauamtic, brief, on treadmill 6/19\par NSVT\par Premature beats\par Mild VHD\par HL\par Ao asc\par \par Follow up ILR\par Follow-up blood work has been requested \par Increase hydration\par

## 2020-05-26 ENCOUNTER — APPOINTMENT (OUTPATIENT)
Dept: CARDIOLOGY | Facility: CLINIC | Age: 65
End: 2020-05-26
Payer: MEDICARE

## 2020-05-26 PROCEDURE — G2066: CPT

## 2020-05-26 PROCEDURE — 93298 REM INTERROG DEV EVAL SCRMS: CPT

## 2020-05-26 NOTE — ASSESSMENT
[FreeTextEntry1] : lnq Remote Summary\par 1-9-20 to 2-9-20\par 2-9-20 to 3-11-20\par \par No events\par \par Next summary 32 days

## 2020-06-30 ENCOUNTER — APPOINTMENT (OUTPATIENT)
Dept: CARDIOLOGY | Facility: CLINIC | Age: 65
End: 2020-06-30
Payer: MEDICARE

## 2020-06-30 PROCEDURE — G2066: CPT

## 2020-06-30 PROCEDURE — 93298 REM INTERROG DEV EVAL SCRMS: CPT

## 2020-06-30 NOTE — ASSESSMENT
[FreeTextEntry1] : Remote LNq Summary\par \par 6-30-20 \par Viewed lnq summary 3-11-20 to 4-11-20. \par No events were noted.\par  Next summary on pa schedule 32days-cv\par \par apt with SD due sept, tasked FD

## 2020-08-03 ENCOUNTER — APPOINTMENT (OUTPATIENT)
Dept: ELECTROPHYSIOLOGY | Facility: CLINIC | Age: 65
End: 2020-08-03
Payer: MEDICARE

## 2020-08-03 ENCOUNTER — NON-APPOINTMENT (OUTPATIENT)
Age: 65
End: 2020-08-03

## 2020-08-03 VITALS
TEMPERATURE: 98.5 F | OXYGEN SATURATION: 96 % | HEART RATE: 66 BPM | BODY MASS INDEX: 19.49 KG/M2 | DIASTOLIC BLOOD PRESSURE: 60 MMHG | HEIGHT: 65 IN | WEIGHT: 117 LBS | SYSTOLIC BLOOD PRESSURE: 124 MMHG

## 2020-08-03 PROCEDURE — 99213 OFFICE O/P EST LOW 20 MIN: CPT

## 2020-08-03 PROCEDURE — 93000 ELECTROCARDIOGRAM COMPLETE: CPT

## 2020-08-03 NOTE — PHYSICAL EXAM
[General Appearance - In No Acute Distress] : no acute distress [General Appearance - Well Developed] : well developed [Normal Conjunctiva] : the conjunctiva exhibited no abnormalities [No Oral Pallor] : no oral pallor [Normal Jugular Venous V Waves Present] : normal jugular venous V waves present [Auscultation Breath Sounds / Voice Sounds] : lungs were clear to auscultation bilaterally [Respiration, Rhythm And Depth] : normal respiratory rhythm and effort [Heart Rate And Rhythm] : heart rate and rhythm were normal [Heart Sounds] : normal S1 and S2 [Murmurs] : no murmurs present [Abdomen Soft] : soft [Abdomen Tenderness] : non-tender [Abnormal Walk] : normal gait [Nail Clubbing] : no clubbing of the fingernails [No Venous Stasis] : no venous stasis [Cyanosis, Localized] : no localized cyanosis [Impaired Insight] : insight and judgment were intact

## 2020-08-07 ENCOUNTER — APPOINTMENT (OUTPATIENT)
Dept: CARDIOLOGY | Facility: CLINIC | Age: 65
End: 2020-08-07
Payer: MEDICARE

## 2020-08-07 PROCEDURE — 93298 REM INTERROG DEV EVAL SCRMS: CPT

## 2020-08-07 PROCEDURE — G2066: CPT

## 2020-08-07 NOTE — ASSESSMENT
[FreeTextEntry1] : Remote LNQ Summary\par \par 8-7-20\par Viewed lnq summary 4-11-20 to 5-12-20, 5-12-20 to 6-12-20 and 6-12-20 to 7-17-20 (approved by admin)\par No events were noted. \par Pt was seen by THEODORA 8-3-20 (note pending)\par Next summary on pa schedule 32d.

## 2020-08-16 NOTE — HISTORY OF PRESENT ILLNESS
[FreeTextEntry1] : \par Patient seen in follow-up evaluation for prior SVT noted on implantable loop monitor.  She is feeling well denies any symptoms of palpitations, dizziness, lightheadedness or any recurrence of syncope.\par \par ILR from July 27, 2020 showed an episode of  bpm.  She was asymptomatic.  It was an abrupt episode with onset and termination.  RR intervals were regular but P waves were not identified.\par \par She has had no prior history of hypertension, diabetes prior heart disease lung disease. There is a questionable history of asthma when she was younger but never had any problems. She has had a history of GE reflux.\par \par Previously: The patient was exercising on  June 14, 2019 treadmill for 30 minutes ( usual time - at 4.5 mph) she had an episode of loss of consciousness which was described as a few seconds - she is not actually does not remember the episode and did not seem to have any prodrome. She went to urgent care center and was then referred to the hospital. The patient required stitches in the chin area. She had a negative head CT scan\par \par She has had evaluation which included an echocardiogram performed in June that showed normal ventricle function ejection fraction approximately 60% mild mitral regurgitation,  normal right ventricle function.\par \par She also had a CT of the coronary arteries that did not show any coronary disease. There is no evidence of anomalous coronary circulation.\par \par The patient had a ZIO patch placed for 13 days start an 6/18-7/2/19. It showed that she had  4 beats of a  wide complex tachycardia at 167bpm. \par She also had 4 beats slower tachycardia at 120 beats per minutes suggestive of ventricular. He had occasional bradycardia to 40 beats a minute range ( while sleeping) and  occasional sinus tachycardia at 135 beats per minute.\par \par The patient is a prior cervical spinal canal  stenosis . She is also prior hip surgery left side. \par \par

## 2020-08-16 NOTE — DISCUSSION/SUMMARY
[FreeTextEntry1] : \par The patient is feeling well and has not had any recurrence of syncope or wide-complex tachycardia.  She had an episode of SVT while doing routine activities for 9 seconds at 176 bpm.  We will continue to monitor her and if she should have recurrent episodes we will consider starting her on a beta-blocker.\par

## 2020-08-19 ENCOUNTER — RESULT REVIEW (OUTPATIENT)
Age: 65
End: 2020-08-19

## 2020-09-10 ENCOUNTER — APPOINTMENT (OUTPATIENT)
Dept: CARDIOLOGY | Facility: CLINIC | Age: 65
End: 2020-09-10
Payer: MEDICARE

## 2020-09-10 PROCEDURE — 93298 REM INTERROG DEV EVAL SCRMS: CPT

## 2020-09-10 PROCEDURE — G2066: CPT

## 2020-09-10 NOTE — ASSESSMENT
[FreeTextEntry1] : \par Remote LNq Summary\par \par 9-10-20 \par Viewed lnq summary 7-17-20 to 8-21-20 \par Tachy event r/w BS and read as SVT x 9 sec with avg v [id number].\par  PT was performing her usual mild to moderate exercise at that time and was completely asymptomatic. \par Given her history of syncope and new onset SVT, recommended followup with EP within a week to discuss.\par  Pt saw RJ on 8/3 (if recurrent consider bb, no changes at that time).\par  Next summary on pa sched 32d-cv

## 2020-09-30 ENCOUNTER — APPOINTMENT (OUTPATIENT)
Dept: CARDIOLOGY | Facility: CLINIC | Age: 65
End: 2020-09-30
Payer: MEDICARE

## 2020-09-30 VITALS
DIASTOLIC BLOOD PRESSURE: 66 MMHG | BODY MASS INDEX: 19.66 KG/M2 | RESPIRATION RATE: 16 BRPM | WEIGHT: 118 LBS | HEIGHT: 65 IN | HEART RATE: 71 BPM | SYSTOLIC BLOOD PRESSURE: 118 MMHG | TEMPERATURE: 99.1 F | OXYGEN SATURATION: 97 %

## 2020-09-30 DIAGNOSIS — E78.00 PURE HYPERCHOLESTEROLEMIA, UNSPECIFIED: ICD-10-CM

## 2020-09-30 PROCEDURE — 99214 OFFICE O/P EST MOD 30 MIN: CPT

## 2020-10-01 PROBLEM — E78.00 HYPERCHOLESTEREMIA: Status: ACTIVE | Noted: 2018-11-14

## 2020-10-01 NOTE — ASSESSMENT
[FreeTextEntry1] : \par Syncope, trauamtic, brief, on treadmill 6/19\par NSVT\par Premature beats\par Mild VHD\par HL\par Ao asc\par \par Follow up ILR, EPS\par ? CMR\par Increase hydration\par

## 2020-10-01 NOTE — HISTORY OF PRESENT ILLNESS
[FreeTextEntry1] : AYAD JARAMILLO  is a 65 year old  F\par \par History of premature beats, arthritis, NSVT, mild VHD\par There is no prior history of a clinical myocardial infarction, coronary revascularization. \par There is no history of symptomatic congestive heart failure rheumatic heart disease or atrial fibrillation.\par \par Previously seen for episode of syncope.  She wass working out on the treadmill at the gym and the next thing she knew she was on the floor.  There was brief transient loss of consciousness.  She had repair of laceration and sent to the emergency room.  Seen in [facility] June 2019\par There was no reported prodrome.  \par Specifically no symptoms of palpitations lightheadedness or nausea.  \par There was no tongue biting or incontinence.  \par She was not confused afterwards.  \par There were no new medications or clear-cut precipitating factors.  \par Ruled out for myocardial infarction.  \par At follow-up visit longer-term monitoring and non invasive testing was recommended.\par \par Baseline active, no limitations\par There is no exertional chest pain, pressure or discomfort. \par There is no significant dyspnea on exertion or orthopnea. \par There are no symptomatic palpitations.\par \par Back to sport time.  Takes exercise classes.  Does weights and aerobics.  \par She had an episode of PSVT.  At that time she is aware of her heart rate being elevated.  She saw electrophysiology.  The symptoms related to dehydration.  \par \par Blood work March 2020 hemoglobin 12.2, potassium 3.9, creatinine 0.5, , total cholesterol 196, TSH 1.6 \par \par last EKG demonstrates sinus bradycardia with poor R wave progression\par Prior Holter monitor from April 2019 describes PVCs with NSVT to 3 beats \par Echocardiogram April 2019  normal left ventricular function mild mitral regurgitation \par Stress echo April 2019 no ischemia\par Carotid Doppler normal study \par Cardiac CTA with 0 calcium score there is no significant atherosclerosis there was aortic atherosclerosis\par Zio Patch demonstrated NSVT to 4 beats there were no sustained dysrhythmias underwent implantable loop recorder August 2019 \par Loop recorder interrogations have not demonstrated any clinical events

## 2020-10-13 ENCOUNTER — APPOINTMENT (OUTPATIENT)
Dept: CARDIOLOGY | Facility: CLINIC | Age: 65
End: 2020-10-13
Payer: MEDICARE

## 2020-10-13 PROCEDURE — G2066: CPT

## 2020-10-13 PROCEDURE — 93298 REM INTERROG DEV EVAL SCRMS: CPT

## 2020-10-13 NOTE — ASSESSMENT
[FreeTextEntry1] : Remote LNQ Summary\par \par 10-13-20 \par Viewed lnq summary 8-21-20 to 9-28-20. \par No events were noted. \par Next summary on pa schedule 32d-cv

## 2020-11-19 ENCOUNTER — APPOINTMENT (OUTPATIENT)
Dept: CARDIOLOGY | Facility: CLINIC | Age: 65
End: 2020-11-19
Payer: MEDICARE

## 2020-11-19 DIAGNOSIS — Z87.898 PERSONAL HISTORY OF OTHER SPECIFIED CONDITIONS: ICD-10-CM

## 2020-11-19 PROCEDURE — 93298 REM INTERROG DEV EVAL SCRMS: CPT

## 2020-11-19 PROCEDURE — G2066: CPT

## 2020-11-19 NOTE — ASSESSMENT
[FreeTextEntry1] : Remote LNQ summary\par \par \par 11-19-20 \par Viewed lnq summary 9-28-20 to 11-2-20. \par No events were noted. \par seen by THEODORA 8-3-20\par NExt summary on pa schedule 32d-cv

## 2021-01-05 ENCOUNTER — APPOINTMENT (OUTPATIENT)
Dept: CARDIOLOGY | Facility: CLINIC | Age: 66
End: 2021-01-05
Payer: MEDICARE

## 2021-01-05 PROCEDURE — 93298 REM INTERROG DEV EVAL SCRMS: CPT

## 2021-01-05 PROCEDURE — G2066: CPT

## 2021-01-05 NOTE — ASSESSMENT
[FreeTextEntry1] : Remote LNQ summary 1/5/21\par \par Viewed lnq summary 11/2/20 to 12/8/20  \par No events were noted. \par Next summary 32.

## 2021-02-11 ENCOUNTER — APPOINTMENT (OUTPATIENT)
Dept: CARDIOLOGY | Facility: CLINIC | Age: 66
End: 2021-02-11
Payer: MEDICARE

## 2021-02-11 PROCEDURE — G2066: CPT

## 2021-02-11 PROCEDURE — 93298 REM INTERROG DEV EVAL SCRMS: CPT

## 2021-02-12 ENCOUNTER — NON-APPOINTMENT (OUTPATIENT)
Age: 66
End: 2021-02-12

## 2021-03-16 ENCOUNTER — APPOINTMENT (OUTPATIENT)
Dept: CARDIOLOGY | Facility: CLINIC | Age: 66
End: 2021-03-16
Payer: MEDICARE

## 2021-03-16 ENCOUNTER — NON-APPOINTMENT (OUTPATIENT)
Age: 66
End: 2021-03-16

## 2021-03-16 PROCEDURE — 93298 REM INTERROG DEV EVAL SCRMS: CPT

## 2021-03-16 PROCEDURE — G2066: CPT

## 2021-04-19 ENCOUNTER — NON-APPOINTMENT (OUTPATIENT)
Age: 66
End: 2021-04-19

## 2021-04-19 ENCOUNTER — APPOINTMENT (OUTPATIENT)
Dept: CARDIOLOGY | Facility: CLINIC | Age: 66
End: 2021-04-19
Payer: MEDICARE

## 2021-04-19 PROCEDURE — 93298 REM INTERROG DEV EVAL SCRMS: CPT

## 2021-04-19 PROCEDURE — G2066: CPT

## 2021-05-24 ENCOUNTER — APPOINTMENT (OUTPATIENT)
Dept: CARDIOLOGY | Facility: CLINIC | Age: 66
End: 2021-05-24
Payer: MEDICARE

## 2021-05-24 ENCOUNTER — NON-APPOINTMENT (OUTPATIENT)
Age: 66
End: 2021-05-24

## 2021-05-24 PROCEDURE — G2066: CPT

## 2021-05-24 PROCEDURE — 93298 REM INTERROG DEV EVAL SCRMS: CPT

## 2021-06-07 ENCOUNTER — NON-APPOINTMENT (OUTPATIENT)
Age: 66
End: 2021-06-07

## 2021-06-09 ENCOUNTER — APPOINTMENT (OUTPATIENT)
Dept: CARDIOLOGY | Facility: CLINIC | Age: 66
End: 2021-06-09
Payer: MEDICARE

## 2021-06-09 VITALS
SYSTOLIC BLOOD PRESSURE: 122 MMHG | HEART RATE: 67 BPM | BODY MASS INDEX: 19.64 KG/M2 | OXYGEN SATURATION: 99 % | DIASTOLIC BLOOD PRESSURE: 66 MMHG | WEIGHT: 118 LBS | TEMPERATURE: 97.3 F

## 2021-06-09 PROCEDURE — 93291 INTERROG DEV EVAL SCRMS IP: CPT

## 2021-06-28 ENCOUNTER — APPOINTMENT (OUTPATIENT)
Dept: CARDIOLOGY | Facility: CLINIC | Age: 66
End: 2021-06-28

## 2021-06-28 ENCOUNTER — NON-APPOINTMENT (OUTPATIENT)
Age: 66
End: 2021-06-28

## 2021-08-03 ENCOUNTER — NON-APPOINTMENT (OUTPATIENT)
Age: 66
End: 2021-08-03

## 2021-08-03 ENCOUNTER — APPOINTMENT (OUTPATIENT)
Dept: CARDIOLOGY | Facility: CLINIC | Age: 66
End: 2021-08-03
Payer: MEDICARE

## 2021-08-03 PROCEDURE — 93298 REM INTERROG DEV EVAL SCRMS: CPT

## 2021-08-03 PROCEDURE — G2066: CPT

## 2021-09-07 ENCOUNTER — NON-APPOINTMENT (OUTPATIENT)
Age: 66
End: 2021-09-07

## 2021-09-07 ENCOUNTER — APPOINTMENT (OUTPATIENT)
Dept: CARDIOLOGY | Facility: CLINIC | Age: 66
End: 2021-09-07
Payer: MEDICARE

## 2021-09-07 PROCEDURE — G2066: CPT

## 2021-09-07 PROCEDURE — 93298 REM INTERROG DEV EVAL SCRMS: CPT

## 2021-10-11 ENCOUNTER — NON-APPOINTMENT (OUTPATIENT)
Age: 66
End: 2021-10-11

## 2021-10-12 ENCOUNTER — APPOINTMENT (OUTPATIENT)
Dept: CARDIOLOGY | Facility: CLINIC | Age: 66
End: 2021-10-12
Payer: MEDICARE

## 2021-10-12 PROCEDURE — 93298 REM INTERROG DEV EVAL SCRMS: CPT

## 2021-10-12 PROCEDURE — G2066: CPT

## 2021-10-26 ENCOUNTER — APPOINTMENT (OUTPATIENT)
Dept: CARDIOLOGY | Facility: CLINIC | Age: 66
End: 2021-10-26
Payer: MEDICARE

## 2021-10-26 ENCOUNTER — NON-APPOINTMENT (OUTPATIENT)
Age: 66
End: 2021-10-26

## 2021-10-26 VITALS
HEART RATE: 82 BPM | OXYGEN SATURATION: 98 % | BODY MASS INDEX: 19.16 KG/M2 | DIASTOLIC BLOOD PRESSURE: 70 MMHG | HEIGHT: 65 IN | WEIGHT: 115 LBS | SYSTOLIC BLOOD PRESSURE: 126 MMHG | TEMPERATURE: 98.6 F

## 2021-10-26 DIAGNOSIS — Z00.00 ENCOUNTER FOR GENERAL ADULT MEDICAL EXAMINATION W/OUT ABNORMAL FINDINGS: ICD-10-CM

## 2021-10-26 PROCEDURE — 99213 OFFICE O/P EST LOW 20 MIN: CPT

## 2021-10-26 PROCEDURE — 93000 ELECTROCARDIOGRAM COMPLETE: CPT

## 2021-10-26 RX ORDER — CIDER VINEGAR 300 MG
1000 TABLET ORAL
Refills: 0 | Status: DISCONTINUED | COMMUNITY
End: 2021-10-26

## 2021-10-26 NOTE — REASON FOR VISIT
[Arrhythmia/ECG Abnorrmalities] : arrhythmia/ECG abnormalities [Structural Heart and Valve Disease] : structural heart and valve disease [Other: ____] : [unfilled]

## 2021-10-31 NOTE — HISTORY OF PRESENT ILLNESS
[FreeTextEntry1] : AYAD JARAMILLO  is a 66 year old  F\par History of premature beats, arthritis, NSVT, mild VHD\par There is no prior history of a clinical myocardial infarction, coronary revascularization. \par There is no history of symptomatic congestive heart failure rheumatic heart disease or atrial fibrillation.\par \par Previously seen for episode of syncope during exercising on treadmill June 2019. There was brief transient loss of consciousness. She was not confused afterwards. She had repair of laceration and sent to the emergency room in [facility].\par There was no reported prodrome.  \par Specifically no symptoms of palpitations, lightheadedness, nausea, tongue biting, or incontinence.  \par There were no new medications or clear-cut precipitating factors.  \par Ruled out MI.\par \par At baseline she is extremely active with no limitations. \par There is no exertional chest pain, pressure or discomfort. \par There is no significant dyspnea on exertion or orthopnea. \par There are no symptomatic palpitations.\par \par No new symptoms. Takes 2 hours of classes and 1 hour of walking at the gym. \par Reports incident in June 2021 of dehydration causing near syncope while working out at home.\par Prior PSVT due to dehydration. \par \par Loop recorder demonstrates no events brief episode of atrial tachycardia \par EKG demonstrates sinus rhythm with poor R wave progression    \par \par Blood work March 2020 hemoglobin 12.2, potassium 3.9, creatinine 0.5, , total cholesterol 196, TSH 1.6 \par \par Prior Holter monitor from April 2019 describes PVCs with NSVT to 3 beats \par Echocardiogram April 2019  normal left ventricular function mild mitral regurgitation \par Stress echo April 2019 no ischemia\par Carotid Doppler normal study \par Cardiac CTA with 0 calcium score there is no significant atherosclerosis there was aortic atherosclerosis\par Zio Patch demonstrated NSVT to 4 beats there were no sustained dysrhythmias underwent implantable loop recorder August 2019 \par \par

## 2021-10-31 NOTE — ASSESSMENT
[FreeTextEntry1] : Syncope, trauamtic, brief, on treadmill 6/19\par NSVT PSVT\par Premature beats\par Mild VHD\par HL\par Ao asc\par \par Patient aware of importance of increasing hydration. Patient will continue current exercise regiment. \par Fasting blood work requested. Echo requested. \par \par Will call with the results. Will continue to monitor loop recorder accordingly. \par \par Instructed to call if any new symptoms\par Oct 26 2021 11:45AM\par

## 2021-11-08 ENCOUNTER — APPOINTMENT (OUTPATIENT)
Dept: CARDIOLOGY | Facility: CLINIC | Age: 66
End: 2021-11-08
Payer: MEDICARE

## 2021-11-08 PROCEDURE — 93306 TTE W/DOPPLER COMPLETE: CPT

## 2021-11-15 ENCOUNTER — NON-APPOINTMENT (OUTPATIENT)
Age: 66
End: 2021-11-15

## 2021-11-15 ENCOUNTER — APPOINTMENT (OUTPATIENT)
Dept: CARDIOLOGY | Facility: CLINIC | Age: 66
End: 2021-11-15
Payer: MEDICARE

## 2021-11-15 PROCEDURE — G2066: CPT | Mod: NC

## 2021-11-15 PROCEDURE — 93298 REM INTERROG DEV EVAL SCRMS: CPT

## 2021-12-19 ENCOUNTER — NON-APPOINTMENT (OUTPATIENT)
Age: 66
End: 2021-12-19

## 2021-12-20 ENCOUNTER — APPOINTMENT (OUTPATIENT)
Dept: CARDIOLOGY | Facility: CLINIC | Age: 66
End: 2021-12-20
Payer: MEDICARE

## 2021-12-20 PROCEDURE — G2066: CPT

## 2021-12-20 PROCEDURE — 93298 REM INTERROG DEV EVAL SCRMS: CPT

## 2022-01-24 ENCOUNTER — NON-APPOINTMENT (OUTPATIENT)
Age: 67
End: 2022-01-24

## 2022-01-24 ENCOUNTER — APPOINTMENT (OUTPATIENT)
Dept: CARDIOLOGY | Facility: CLINIC | Age: 67
End: 2022-01-24
Payer: MEDICARE

## 2022-01-24 PROCEDURE — 93298 REM INTERROG DEV EVAL SCRMS: CPT

## 2022-01-24 PROCEDURE — G2066: CPT

## 2022-02-28 ENCOUNTER — NON-APPOINTMENT (OUTPATIENT)
Age: 67
End: 2022-02-28

## 2022-02-28 ENCOUNTER — APPOINTMENT (OUTPATIENT)
Dept: CARDIOLOGY | Facility: CLINIC | Age: 67
End: 2022-02-28
Payer: MEDICARE

## 2022-02-28 PROCEDURE — 93298 REM INTERROG DEV EVAL SCRMS: CPT

## 2022-02-28 PROCEDURE — G2066: CPT

## 2022-03-18 ENCOUNTER — OUTPATIENT (OUTPATIENT)
Dept: OUTPATIENT SERVICES | Facility: HOSPITAL | Age: 67
LOS: 1 days | End: 2022-03-18

## 2022-03-18 DIAGNOSIS — E53.8 DEFICIENCY OF OTHER SPECIFIED B GROUP VITAMINS: ICD-10-CM

## 2022-03-18 DIAGNOSIS — M62.81 MUSCLE WEAKNESS (GENERALIZED): ICD-10-CM

## 2022-03-18 DIAGNOSIS — R55 SYNCOPE AND COLLAPSE: ICD-10-CM

## 2022-03-18 DIAGNOSIS — E55.9 VITAMIN D DEFICIENCY, UNSPECIFIED: ICD-10-CM

## 2022-03-18 DIAGNOSIS — R53.83 OTHER FATIGUE: ICD-10-CM

## 2022-04-01 ENCOUNTER — NON-APPOINTMENT (OUTPATIENT)
Age: 67
End: 2022-04-01

## 2022-04-04 ENCOUNTER — APPOINTMENT (OUTPATIENT)
Dept: CARDIOLOGY | Facility: CLINIC | Age: 67
End: 2022-04-04
Payer: MEDICARE

## 2022-04-04 ENCOUNTER — NON-APPOINTMENT (OUTPATIENT)
Age: 67
End: 2022-04-04

## 2022-04-04 PROCEDURE — G2066: CPT

## 2022-04-04 PROCEDURE — 93298 REM INTERROG DEV EVAL SCRMS: CPT

## 2022-05-09 ENCOUNTER — NON-APPOINTMENT (OUTPATIENT)
Age: 67
End: 2022-05-09

## 2022-05-09 ENCOUNTER — APPOINTMENT (OUTPATIENT)
Dept: CARDIOLOGY | Facility: CLINIC | Age: 67
End: 2022-05-09
Payer: MEDICARE

## 2022-05-09 PROCEDURE — G2066: CPT

## 2022-05-09 PROCEDURE — 93298 REM INTERROG DEV EVAL SCRMS: CPT

## 2022-06-14 ENCOUNTER — APPOINTMENT (OUTPATIENT)
Dept: CARDIOLOGY | Facility: CLINIC | Age: 67
End: 2022-06-14
Payer: MEDICARE

## 2022-06-14 ENCOUNTER — NON-APPOINTMENT (OUTPATIENT)
Age: 67
End: 2022-06-14

## 2022-06-14 PROCEDURE — 93298 REM INTERROG DEV EVAL SCRMS: CPT

## 2022-06-14 PROCEDURE — G2066: CPT

## 2022-06-28 ENCOUNTER — NON-APPOINTMENT (OUTPATIENT)
Age: 67
End: 2022-06-28

## 2022-06-28 LAB
ALBUMIN SERPL ELPH-MCNC: 4.3 G/DL
ALP BLD-CCNC: 72 U/L
ALT SERPL-CCNC: 13 U/L
ANION GAP SERPL CALC-SCNC: 12 MMOL/L
AST SERPL-CCNC: 18 U/L
BILIRUB SERPL-MCNC: 1.2 MG/DL
BUN SERPL-MCNC: 10 MG/DL
CALCIUM SERPL-MCNC: 9 MG/DL
CHLORIDE SERPL-SCNC: 104 MMOL/L
CHOLEST SERPL-MCNC: 145 MG/DL
CK SERPL-CCNC: 76 U/L
CO2 SERPL-SCNC: 25 MMOL/L
CREAT SERPL-MCNC: 0.65 MG/DL
EGFR: 95 ML/MIN/1.73M2
GLUCOSE SERPL-MCNC: 91 MG/DL
HDLC SERPL-MCNC: 66 MG/DL
LDLC SERPL CALC-MCNC: 68 MG/DL
NONHDLC SERPL-MCNC: 79 MG/DL
POTASSIUM SERPL-SCNC: 3.9 MMOL/L
PROT SERPL-MCNC: 6.1 G/DL
SODIUM SERPL-SCNC: 141 MMOL/L
TRIGL SERPL-MCNC: 60 MG/DL

## 2022-07-18 ENCOUNTER — NON-APPOINTMENT (OUTPATIENT)
Age: 67
End: 2022-07-18

## 2022-07-18 ENCOUNTER — APPOINTMENT (OUTPATIENT)
Dept: CARDIOLOGY | Facility: CLINIC | Age: 67
End: 2022-07-18

## 2022-07-18 PROCEDURE — 93298 REM INTERROG DEV EVAL SCRMS: CPT

## 2022-07-18 PROCEDURE — G2066: CPT

## 2022-08-22 ENCOUNTER — NON-APPOINTMENT (OUTPATIENT)
Age: 67
End: 2022-08-22

## 2022-08-22 ENCOUNTER — APPOINTMENT (OUTPATIENT)
Dept: CARDIOLOGY | Facility: CLINIC | Age: 67
End: 2022-08-22

## 2022-08-22 PROCEDURE — G2066: CPT

## 2022-08-22 PROCEDURE — 93298 REM INTERROG DEV EVAL SCRMS: CPT

## 2022-09-20 ENCOUNTER — RESULT REVIEW (OUTPATIENT)
Age: 67
End: 2022-09-20

## 2022-09-22 ENCOUNTER — NON-APPOINTMENT (OUTPATIENT)
Age: 67
End: 2022-09-22

## 2022-09-22 ENCOUNTER — APPOINTMENT (OUTPATIENT)
Dept: CARDIOLOGY | Facility: CLINIC | Age: 67
End: 2022-09-22

## 2022-09-22 PROCEDURE — 93298 REM INTERROG DEV EVAL SCRMS: CPT

## 2022-09-22 PROCEDURE — G2066: CPT

## 2022-10-18 ENCOUNTER — NON-APPOINTMENT (OUTPATIENT)
Age: 67
End: 2022-10-18

## 2022-10-18 ENCOUNTER — APPOINTMENT (OUTPATIENT)
Dept: CARDIOLOGY | Facility: CLINIC | Age: 67
End: 2022-10-18

## 2022-10-18 VITALS
TEMPERATURE: 98.4 F | SYSTOLIC BLOOD PRESSURE: 132 MMHG | OXYGEN SATURATION: 98 % | BODY MASS INDEX: 19.33 KG/M2 | WEIGHT: 116 LBS | HEART RATE: 78 BPM | DIASTOLIC BLOOD PRESSURE: 64 MMHG | HEIGHT: 65 IN

## 2022-10-18 DIAGNOSIS — R94.31 ABNORMAL ELECTROCARDIOGRAM [ECG] [EKG]: ICD-10-CM

## 2022-10-18 DIAGNOSIS — I47.29 OTHER VENTRICULAR TACHYCARDIA: ICD-10-CM

## 2022-10-18 DIAGNOSIS — I34.0 NONRHEUMATIC MITRAL (VALVE) INSUFFICIENCY: ICD-10-CM

## 2022-10-18 PROCEDURE — 93000 ELECTROCARDIOGRAM COMPLETE: CPT

## 2022-10-18 PROCEDURE — 99214 OFFICE O/P EST MOD 30 MIN: CPT

## 2022-10-18 RX ORDER — ELECTROLYTES/DEXTROSE
SOLUTION, ORAL ORAL
Refills: 0 | Status: DISCONTINUED | COMMUNITY
End: 2022-10-18

## 2022-10-27 ENCOUNTER — NON-APPOINTMENT (OUTPATIENT)
Age: 67
End: 2022-10-27

## 2022-10-27 ENCOUNTER — APPOINTMENT (OUTPATIENT)
Dept: CARDIOLOGY | Facility: CLINIC | Age: 67
End: 2022-10-27

## 2022-10-27 PROCEDURE — 93298 REM INTERROG DEV EVAL SCRMS: CPT

## 2022-10-27 PROCEDURE — G2066: CPT

## 2022-11-09 NOTE — REASON FOR VISIT
[FreeTextEntry1] : Mrs. Ngo is a 64 year old female that came in today July 16, 2019 in clinical follow-up. Last seen on June 20, 2019. There have been no further episodes of syncope or near syncope.. Completed Cardiac CTA and ZIO patch monitor. \par No new symptoms. \par \par As mentioned previously, on 6-14-19 after exercising on the treadmill x 30minutes she had an episode of a loss of consciousness x a couple of seconds. She does not remember the actual episode of falling. She did not have any prodrome symptoms prior.  She did admit to not hydrating well.\par She was evaluated at [facility] (with no significant findings per patient neg head CT, still awaiting records). She did injure her chin (needing stitches)Follow up outpatient was advised to r/o arrhythmia. \par \par She has exercised since our last visit and has felt fine (note; she has been exercising x 3 hours a day x 5 days a week for the past 4.5 years).  \par \par As mentioned previously she has a history of transient palpitations,  x years improving over the years (no associated symptoms) and hyperlipidemia. \par \par She continues to deny any chest pain, sob, dizziness, orthopnea and PND. No additional syncopal episodes since past visit.  \par \par Labs/tests\par \par ZIO patch monitor 4 beat run of NSVT, minimal heart rate 40 beats per minute, max heart rate 135, average heart rate 74. Short run atach\par \par CT of the coronary arteries demonstrates no evidence of coronary artery disease.  Right coronary artery, left main, originating in the typical fashion.  No evidence of anomalous coronary artery.\par \par echocardiogram 4-11-19 EF 60% with normal ventricular function, mild MR, mild dilated left atrium, minimal TR minimal Pr.  Normal RV size and function\par \par Stress echocardiogram 4-17-19 patient is 10 minutes reaching 94% of MPHR, revealing normal electrocardiographic response and normal augmentation in left ventricular systolic function\par \par Holter 4-11-19 normal sinus rhythm rate  beats per minute averaging 73bpm with rare to occasional APCs and rare sequential 2-6 beats up to 179bpm, multifocal and frequent  PVCs with rare couplets and a 3 beat ventricular tachycardia noted. No symptoms\par Carotid ultrasound 4-11-19 normal study\par \par Labs 4-17-19 , HDL 69, triglycerides 85\par \par Labs 6-14-19 white blood cell 10, hemoglobin 13.8, hematocrit 40.9, platelets 331, sodium 139 potassium 3.8, BUN 13, creatinine 0.6, magnesium 1.7, AST 18, , troponin <0.010\par \par Knee x-ray 6-14-19 no acute fracture\par \par Chest x-ry 6-14-19 negative study\par \par CT brain without contrast 6-14-19 no evidence of acute intracranial process. \par \par CT spine without contrast 6-14-19 no fracture or subluxation, multilevel degenerative changes posterior disc osteophyte complexes at C4-C5, C5-C6 and C6-C7 which appears was moderate severe spinal canal stenosis\par \par 
Sh  Spoke with Dental, Dr. Garcias  Has agreed to see pt as well as to contact pediatrics for recommendations   Per dental, recommends pt starting amoxicillin

## 2022-11-11 ENCOUNTER — APPOINTMENT (OUTPATIENT)
Dept: ELECTROPHYSIOLOGY | Facility: CLINIC | Age: 67
End: 2022-11-11

## 2022-11-11 VITALS
TEMPERATURE: 97.1 F | OXYGEN SATURATION: 96 % | WEIGHT: 116 LBS | BODY MASS INDEX: 19.33 KG/M2 | HEIGHT: 65 IN | HEART RATE: 63 BPM

## 2022-11-11 DIAGNOSIS — I47.1 SUPRAVENTRICULAR TACHYCARDIA: ICD-10-CM

## 2022-11-11 PROCEDURE — 99214 OFFICE O/P EST MOD 30 MIN: CPT

## 2022-11-11 NOTE — PHYSICAL EXAM
[General Appearance - Well Developed] : well developed [General Appearance - In No Acute Distress] : no acute distress [Normal Conjunctiva] : the conjunctiva exhibited no abnormalities [No Oral Pallor] : no oral pallor [Normal Jugular Venous V Waves Present] : normal jugular venous V waves present [Respiration, Rhythm And Depth] : normal respiratory rhythm and effort [Auscultation Breath Sounds / Voice Sounds] : lungs were clear to auscultation bilaterally [Heart Rate And Rhythm] : heart rate and rhythm were normal [Heart Sounds] : normal S1 and S2 [Murmurs] : no murmurs present [Abdomen Soft] : soft [Abdomen Tenderness] : non-tender [Abnormal Walk] : normal gait [Nail Clubbing] : no clubbing of the fingernails [Cyanosis, Localized] : no localized cyanosis [No Venous Stasis] : no venous stasis [Impaired Insight] : insight and judgment were intact

## 2022-11-26 PROBLEM — I47.1 PSVT (PAROXYSMAL SUPRAVENTRICULAR TACHYCARDIA): Status: ACTIVE | Noted: 2020-10-01

## 2022-11-26 NOTE — HISTORY OF PRESENT ILLNESS
[FreeTextEntry1] : \par The patient is a 67-year-old woman who is seen in follow-up evaluation because of AV block that was noted on her ILR for October 26, 2022.  This occurred around 11 AM.  At that time the patient explains that she was getting anesthesia for a colonoscopy.  She was not told anything after the colonoscopy terms of whether there are heartbeat issues.  Post procedure she had no issues and post discharged to follow-up today she has not had any symptoms of dizziness, lightheadedness, syncope or presyncope.\par \par ILR interrogated today: 2 pauses noted and episode of SVTs.  \par \par She has had a prior history of SVTs and the last episode being outside May 22, 2022 and June 13, 2022 episodes lasting 5 seconds and 26 seconds approximately 180 bpm.  \par \par She is currently not on any AV blocker.\par She has had no prior history of hypertension, diabetes prior heart disease lung disease. There is a questionable history of asthma when she was younger but never had any problems. She has had a history of GE reflux.\par \par Previously: The patient was exercising on  June 14, 2019 treadmill for 30 minutes ( usual time - at 4.5 mph) she had an episode of loss of consciousness which was described as a few seconds - she is not actually does not remember the episode and did not seem to have any prodrome. She went to urgent care center and was then referred to the hospital. The patient required stitches in the chin area. She had a negative head CT scan\par \par She has had evaluation which included an echocardiogram performed in June that showed normal ventricle function ejection fraction approximately 60% mild mitral regurgitation,  normal right ventricle function.\par \par She also had a CT of the coronary arteries that did not show any coronary disease. There is no evidence of anomalous coronary circulation.\par \par The patient had a ZIO patch placed for 13 days start an 6/18-7/2/19. It showed that she had  4 beats of a  wide complex tachycardia at 167bpm. \par She also had 4 beats slower tachycardia at 120 beats per minutes suggestive of ventricular. He had occasional bradycardia to 40 beats a minute range ( while sleeping) and  occasional sinus tachycardia at 135 beats per minute.\par \par The patient is a prior cervical spinal canal  stenosis . She is also prior hip surgery left side. \par \par

## 2022-11-26 NOTE — REVIEW OF SYSTEMS
[Cough] : cough [Feeling Fatigued] : not feeling fatigued [Blurry Vision] : no blurred vision [Dyspnea on exertion] : not dyspnea during exertion [Chest Discomfort] : no chest discomfort [Palpitations] : no palpitations [Orthopnea] : no orthopnea [Abdominal Pain] : no abdominal pain [Dizziness] : no dizziness [FreeTextEntry7] : acid reflux/nasal drip

## 2022-11-26 NOTE — DISCUSSION/SUMMARY
[FreeTextEntry1] : \par This is a patient has had a prior history of SVT.  She is currently on no medication for control of SVT.  She has been doing well with exception of 2 very short episodes in May and June 2022.  She was not aware of these episodes in terms of symptoms.  On the day of her colonoscopy that time of her given anesthesia it was noted that she had 6 and 7 seconds pause with AV block.  She has not had any recurrent episodes since then.  The patient is not on any beta-blocker.\par \par The plan is to continue to monitor her remote and should we see recurrent AV block we will reassess her. She does not need pacing currently.  My concern more is her SVT and if this were to become more  frequent we would have to rediscuss the option of catheter ablation.\par \par The patient expressed understanding and verbalized understanding regarding the plans and recommendations.\par

## 2022-11-30 ENCOUNTER — APPOINTMENT (OUTPATIENT)
Dept: CARDIOLOGY | Facility: CLINIC | Age: 67
End: 2022-11-30

## 2022-11-30 ENCOUNTER — NON-APPOINTMENT (OUTPATIENT)
Age: 67
End: 2022-11-30

## 2022-11-30 PROCEDURE — 93298 REM INTERROG DEV EVAL SCRMS: CPT

## 2022-11-30 PROCEDURE — G2066: CPT

## 2022-12-14 NOTE — HISTORY OF PRESENT ILLNESS
[FreeTextEntry1] : AYAD JARAMILLO  is a 67 year old  F\par \par History of premature beats, arthritis, NSVT, mild VHD, HL\par There is no prior history of a clinical myocardial infarction, coronary revascularization. \par There is no history of symptomatic congestive heart failure rheumatic heart disease or atrial fibrillation.\par \par Previously seen for episode of syncope during exercising on treadmill June 2019. There was brief transient loss of consciousness. She was not confused afterwards. She had repair of laceration and sent to the emergency room in [facility].\par There was no reported prodrome.  \par Specifically no symptoms of palpitations, lightheadedness, nausea, tongue biting, or incontinence.  \par There were no new medications or clear-cut precipitating factors.  \par Ruled out MI.\par \par At baseline she is extremely active with no limitations. \par There is no exertional chest pain, pressure or discomfort. \par There is no significant dyspnea on exertion or orthopnea. \par There are no symptomatic palpitations.\par \par No new symptoms. Takes 2 hours of classes and 1 hour of walking at the gym. \par Reports incident in June 2021 of dehydration causing near syncope while working out at home.\par Prior PSVT due to dehydration. \par \par 2021 total cholesterol 221  creatinine 0.7 potassium 4.7 TSH LP(a) BNP magnesium A1c within normal limits.  \par echocardiogram demonstrates normal left ventricular function.  Minimal valvular heart disease. \par Loop recorder checked previously demonstrated an episode of SVT.  Nonsustained lasted less than 30 seconds.\par blood work March 2022 total cholesterol 216  creatinine 0.6 hemoglobin 12.6 \par \par started low-dose preventive statin therapy \par June 2022 total cholesterol 145 LDL 68. \par EKG demonstrates sinus rhythm with poor R wave progression    \par Prior Holter monitor from April 2019 describes PVCs with NSVT to 3 beats \par Echocardiogram April 2019  normal left ventricular function mild mitral regurgitation \par Stress echo April 2019 no ischemia\par Carotid Doppler normal study \par Cardiac CTA with 0 calcium score there is no significant atherosclerosis there was aortic atherosclerosis\par Zio Patch demonstrated NSVT to 4 beats there were no sustained dysrhythmias underwent implantable loop recorder August 2019 \par

## 2022-12-14 NOTE — ASSESSMENT
[FreeTextEntry1] : \par Syncope, trauamtic, brief, on treadmill 6/19\par NSVT PSVT\par Premature beats\par Mild VHD\par Ao asc\par \par Hyperlipidemia.  Intermediate ASCVD risk score \par Excellent response to low-dose statin therapy \par \par Patient aware of importance of increasing hydration. Patient will continue current exercise regiment. \par \par monitor loop recorder

## 2022-12-14 NOTE — ADDENDUM
[FreeTextEntry1] : EKG obtained to assist in the diagnosis and management of assisted problem(s).\par

## 2023-01-04 ENCOUNTER — NON-APPOINTMENT (OUTPATIENT)
Age: 68
End: 2023-01-04

## 2023-01-04 ENCOUNTER — APPOINTMENT (OUTPATIENT)
Dept: CARDIOLOGY | Facility: CLINIC | Age: 68
End: 2023-01-04
Payer: MEDICARE

## 2023-01-04 PROCEDURE — 93298 REM INTERROG DEV EVAL SCRMS: CPT

## 2023-01-04 PROCEDURE — G2066: CPT

## 2023-02-03 NOTE — PHYSICAL EXAM
CANDIDA = base of support)  Firm Surface: Normal Appearance] : normal appearance with 3 stairs to enter, a walk in shower, grab bars, and shower bench. Patient completed functional mobility with supervision initially, but progressed to modified independent with the cane with assist x1. Requires minimal-max assist with dressing tasks, but will have assistance from neighbor and  while recovering at home. No further questions or ADL needs at this time. OT currently recommends outpatient therapy per MD protocol once medically cleared.            Discharge Recommendations:  Recommendation for Discharge Location: OT WI: Home without therapy needs  Recommendation for Discharge Support: OT [place]: Intermittent assist daily, Assistance with IADLs  PT/OT Mobility Equipment for Discharge: has cane, 2ww  PT/OT ADL Equipment for Discharge: has shower chair, grab bars, and shower bench        • Personal Occupations Profile Affected: bathing/showering, toileting/toilet hygiene, lower body dressing, care of others, health management/maintenance, detention preparation/adjustment, social participation community      • Clinical decision making: Low - Patient has few limitations (1-3), comorbidities and/or complexities, as noted in problem focused assessment noted above, that impact their occupational profile.  Resulting in few treatment options and no task modification consistent with low clinical decision making complexity.    Education:   - Present and ready to learn: patient  - Present and not ready to learn: patient    Patient at End of Session:   Location: in chair  Safety measures: call light within reach  Handoff to: nurse    PLAN  Suggestions for next session as indicated:     Frequency Comments: D/C OT        Interventions: ADL retraining, balance, activity tolerance training, compensatory technique education, equipment eval/education, functional transfer training, upper extremity strengthening/ROM, therapeutic exercise and patient education  Agreement to plan and goals: patient agrees with goals and  [No Deformities] : no deformities no respiratory distress [Respiration, Rhythm And Depth] : normal respiratory rhythm and effort [Exaggerated Use Of Accessory Muscles For Inspiration] : no accessory muscle use [Heart Sounds] : normal S1 and S2 [Bowel Sounds] : normal bowel sounds [Abdomen Soft] : soft [Abnormal Walk] : normal gait [Affect] : the affect was normal [Skin Color & Pigmentation] : normal skin color and pigmentation [Mood] : the mood was normal [FreeTextEntry1] : 1/6 justo

## 2023-02-06 ENCOUNTER — APPOINTMENT (OUTPATIENT)
Dept: CARDIOLOGY | Facility: CLINIC | Age: 68
End: 2023-02-06
Payer: MEDICARE

## 2023-02-06 ENCOUNTER — NON-APPOINTMENT (OUTPATIENT)
Age: 68
End: 2023-02-06

## 2023-02-06 PROCEDURE — 93298 REM INTERROG DEV EVAL SCRMS: CPT

## 2023-02-06 PROCEDURE — G2066: CPT

## 2023-02-20 ENCOUNTER — NON-APPOINTMENT (OUTPATIENT)
Age: 68
End: 2023-02-20

## 2023-02-21 ENCOUNTER — EMERGENCY (EMERGENCY)
Facility: HOSPITAL | Age: 68
LOS: 1 days | Discharge: HOME | End: 2023-02-21
Attending: EMERGENCY MEDICINE | Admitting: EMERGENCY MEDICINE
Payer: MEDICARE

## 2023-02-21 VITALS
HEART RATE: 71 BPM | RESPIRATION RATE: 16 BRPM | OXYGEN SATURATION: 98 % | TEMPERATURE: 98 F | SYSTOLIC BLOOD PRESSURE: 146 MMHG | DIASTOLIC BLOOD PRESSURE: 65 MMHG

## 2023-02-21 PROCEDURE — 99284 EMERGENCY DEPT VISIT MOD MDM: CPT | Mod: FS

## 2023-02-21 NOTE — ED ADULT TRIAGE NOTE - CHIEF COMPLAINT QUOTE
pt is a Transfer from [facility], tripped and fell in a parking garbage, hit her face. arrives with lac to the lower lip, sutured. 2 chipped front teeth, lac inside the bottom lip not sutured transferred to [facility] to get sutured. pt in no distress. denies any headache, dizziness, blurry vision, n/v. not on blood thinners. hx. HLD

## 2023-02-22 VITALS
DIASTOLIC BLOOD PRESSURE: 73 MMHG | RESPIRATION RATE: 18 BRPM | OXYGEN SATURATION: 99 % | TEMPERATURE: 98 F | SYSTOLIC BLOOD PRESSURE: 163 MMHG | HEART RATE: 85 BPM

## 2023-02-22 RX ORDER — AMOXICILLIN 250 MG/5ML
1 SUSPENSION, RECONSTITUTED, ORAL (ML) ORAL
Qty: 14 | Refills: 0
Start: 2023-02-22 | End: 2023-02-28

## 2023-02-22 RX ORDER — CHLORHEXIDINE GLUCONATE 213 G/1000ML
15 SOLUTION TOPICAL
Qty: 210 | Refills: 0
Start: 2023-02-22 | End: 2023-02-28

## 2023-02-22 NOTE — ED ADULT NURSE NOTE - HOW OFTEN DO YOU HAVE A DRINK CONTAINING ALCOHOL?
Spoke with patient who reports limited mobility to shoulder. Got progressively worse after completion of prednisone course last month. Has physical therapy appointment on Friday and wants medication to help her get through this. Advised a return office visit for repeat evaluation. Has follow up with Dr. Wiggins next week. Opening today that patient is able to make so scheduled this appointment.   Never

## 2023-02-22 NOTE — ED ADULT NURSE NOTE - CHIEF COMPLAINT QUOTE
pt is a Transfer from [facility], tripped and fell in a parking garbage, hit her face. arrives with lac to the lower lip, sutured. 2 chipped front teeth, lac inside the bottom lip not sutured transferred to [facility] to get sutured. pt in no distress. denies any headache, dizziness, blurry vision, n/v. not on blood thinners. hx. HLD

## 2023-02-22 NOTE — ED PROVIDER NOTE - ENMT, MLM
Airway patent, Nasal mucosa clear. Mouth with normal mucosa. Throat has no vesicles, no oropharyngeal exudates and uvula is midline.  Repaired lac to lower lip.   Gapping laceration below lower gum line where it meets the lip exposing tooth roots.

## 2023-02-22 NOTE — ED PROVIDER NOTE - NSFOLLOWUPINSTRUCTIONS_ED_ALL_ED_FT
Take antibiotics as prescribed     Use Peridex rinse two times daily     Follow up in oral surgery clinic in one week    Laceration    A laceration is a cut that goes through all of the layers of the skin and into the tissue that is right under the skin. Some lacerations heal on their own. Others need to be closed with skin adhesive strips, skin glue, stitches (sutures), or staples. Proper laceration care minimizes the risk of infection and helps the laceration to heal better.  If non-absorbable stitches or staples have been placed, they must be taken out within the time frame instructed by your healthcare provider.    SEEK IMMEDIATE MEDICAL CARE IF YOU HAVE ANY OF THE FOLLOWING SYMPTOMS: swelling around the wound, worsening pain, drainage from the wound, red streaking going away from your wound, inability to move finger or toe near the laceration, or discoloration of skin near the laceration.

## 2023-02-22 NOTE — ED ADULT NURSE NOTE - NS_SISCREENINGSR_GEN_ALL_ED
-Patient with a recurrent issue of knee pain despite normal xray done  I am recommending she use Voltaren 1% gel to the affected knee and use of Tramadol  Checked PDMP and patient never received Tramadol before  Recommended weight loss as this can also cause strain on her left knee  Advised to possibly start on physical therapy  Referral given to ortho as this has been a recurrent issue for patient  Negative

## 2023-02-22 NOTE — CONSULT NOTE ADULT - SUBJECTIVE AND OBJECTIVE BOX
AYAD JARAMILLO 68y  [id number]    HPI:   68y Female with past medical history significant for HCL and prior syncopal episodes presents to [facility] ED s/p fall as a transfer from [facility]. OMFS was consulted for vestibular lower lip laceration. CT Maxillofacial was obtained which showed no facial fractures.     PMH: Syncope, HCL  Meds: Crestor  PSH: Loop recorder  Allergies:   Neomycin (Rash)    SOCIAL HISTORY:   ETOH use: Denies   Tobacco use:  Denies   Recreational drug use: Denies       Review of Systems: Patient denies loss of consciousness, fever, chills, nausea, vomiting, headache, CP, SOB, cough, palpitations, blurred vision/double vision, dysphagia, dyspnea.      Physical Exam:   Gen: AAOx3, NAD   Head: Edema/tenderness to palpation on lower lip, lacerations on intraoral/extraoral lip, extraoral lip laceration repaired at [facility] no ecchymosis    Eyes: EOMI, PERRL, visual acuity intact, no diplopia, supra/infra orbital rims intact, no subconjunctival heme, no telecanthus, no exophthalmos   Ears: Gross hearing intact, no heme appreciated, condylar head palpated bilaterally, no otorrhea    Nose: No septal hematoma/asymmetry, no epistaxis bilaterally, no rhinorrhea, no abrasions present, no lacerations,  Malar: No malar depression, no CN V-2 paresthesia   Throat: No LAD, supple, trachea midline  Extraoral/Intraoral Exam: ELISSA: 40, dentition grossly intact, occlusion is stable and reproducible, no CN V-3 paresthesia, no mobility of maxilla/crepitis, TMJ: No clicking/popping     Vitals:   Vital Signs Last 24 Hrs  T(C): 36.9 (22 Feb 2023 05:18), Max: 36.9 (22 Feb 2023 05:18)  T(F): 98.4 (22 Feb 2023 05:18), Max: 98.4 (22 Feb 2023 05:18)  HR: 85 (22 Feb 2023 05:18) (71 - 85)  BP: 163/73 (22 Feb 2023 05:18) (146/65 - 163/73)  BP(mean): --  RR: 18 (22 Feb 2023 05:18) (16 - 18)  SpO2: 99% (22 Feb 2023 05:18) (98% - 99%)    Parameters below as of 22 Feb 2023 05:18  Patient On (Oxygen Delivery Method): room air    Procedure:   Patient was informed of the procedure of an intraoral lip vestibular laceration repair. Discussed the risks benefits and alternatives including doing nothing. Patient elected to proceed with intraoral lip vestibular laceration repair. Consent was signed and placed in patients chart. b/l RIVERA nerve blocks w/ 3.4cc 2% lidocaine w/ 1:100,000 epinephrine was used to achieve adequate anesthesia.  Copious irrigation was used to clean site, 3-0 vicryl sutures for muscle layer and subdermal layer were used. 3-0 chomic gut sutures used to reapproximate mucosal layer.     CT Maxillofacial:  No facial fractures noted

## 2023-02-22 NOTE — ED PROVIDER NOTE - NS ED ATTENDING STATEMENT MOD
This was a shared visit with the BENITA. I reviewed and verified the documentation and independently performed the documented:

## 2023-02-22 NOTE — ED ADULT NURSE REASSESSMENT NOTE - NS ED NURSE REASSESS COMMENT FT1
Pt a&ox4, ambulatory, denies pain at this time. IV removed. Respirations are even and unlabored, no signs of respiratory distress.

## 2023-02-22 NOTE — ED ADULT NURSE NOTE - OBJECTIVE STATEMENT
Pt received to room 17. Pt A&Ox4, ambulatory at baseline. Pt transfer from [facility], states she lost her footing when walking up steps and fell. Pt arrives with sutures to bottom lip but was transferred for OMFS consult for sutures to inside of mouth. No bleeding noted to site. Pt denies any LOC. Denies any headache, dizziness, SOB, CP. IV access established with 20G to L AC. Pt in NAD. Denies blood thinner use. Safety measures in place

## 2023-02-22 NOTE — ED ADULT NURSE NOTE - NSIMPLEMENTINTERV_GEN_ALL_ED
Implemented All Fall Risk Interventions:  [place] to call system. Call bell, personal items and telephone within reach. Instruct patient to call for assistance. Room bathroom lighting operational. Non-slip footwear when patient is off stretcher. Physically safe environment: no spills, clutter or unnecessary equipment. Stretcher in lowest position, wheels locked, appropriate side rails in place. Provide visual cue, wrist band, yellow gown, etc. Monitor gait and stability. Monitor for mental status changes and reorient to person, place, and time. Review medications for side effects contributing to fall risk. Reinforce activity limits and safety measures with patient and family.

## 2023-02-22 NOTE — ED PROVIDER NOTE - ATTENDING APP SHARED VISIT CONTRIBUTION OF CARE
I performed a face-to-face evaluation of the patient and performed a history and physical examination along with the resident or ACP, and/or medical student above.  I agree with the history and physical examination as documented by the resident or ACP, and/or medical student above.  Rosa:  Gen: Alert, NAD  Head: NC, AT,  EOMI, normal lids/conjunctiva  ENT:  normal hearing, patent oropharynx,  upper incisors; sutured lac on lower lip.   Gapping laceration below lower gum line where it meets the lip exposing tooth roots.  Neck: +supple, no tenderness/meningismus/JVD, +Trachea midline  Chest: no chest wall tenderness, equal chest rise  Pulm: Bilateral BS, normal resp effort, no wheeze/stridor/retractions  CV: RRR, no M/R/G, +dist pulses  Abd: +BS, soft, NT/ND  Rectal: deferred  Mskel: no edema/erythema/cyanosis  Skin: no rash  Neuro: AAOx3, no sensory/motor deficits, CN 2-12 intact

## 2023-02-22 NOTE — CONSULT NOTE ADULT - ASSESSMENT
Assessment and Recommendation:   68y Female with HCL and syncope presented s/p fall with anterior vestibular lip laceration. Laceration repaired bedside by OMFS.    Plan:  - Peridex 15ml BID   - Amoxicillin 500mg TID   - Pain control per ED  - Patient should follow up as an outpatient at [facility] in one week.     [facility]   Address: [id number] [place] [address]  Phone: [phone]    Girma Martinez DDS   Oral and Maxillofacial Surgery   Pager #[phone]  Available on Microsoft Teams

## 2023-02-22 NOTE — ED PROVIDER NOTE - NSFOLLOWUPCLINICS_GEN_ALL_ED_FT
Oral & Maxillofacial Surgery  Department of Dental Medicine  [phone] [place] [address]  Phone: [phone]  Fax: [phone]  Follow Up Time: 7-10 Days

## 2023-02-22 NOTE — ED PROVIDER NOTE - OBJECTIVE STATEMENT
68 y.o. female transfer from [facility] for OMFS 2/2 inner lower lip lac. Pt presented to the ER with laceration to lower lip and chipped tooth from a trip and fall going up a step in parking garage. Pt had CT head, max/face which were grossly normal. Pt found to have lower lip lac which was repair at [facility]. Pt also found to have gapping laceration below lower gum line where it meets the lip exposing tooth roots. OMFS was called and accepted transfer. Pt has no further complaints at this time.

## 2023-02-22 NOTE — ED PROVIDER NOTE - CLINICAL SUMMARY MEDICAL DECISION MAKING FREE TEXT BOX
68 y.o. female transfer from [facility] for OMFS 2/2 inner lower lip lac. Pt presented to the ER with laceration to lower lip and chipped tooth from a trip and fall going up a step in parking garage. Pt had CT head, max/face which were grossly normal. Pt found to have lower lip lac which was repair at [facility]. Pt also found to have gapping laceration below lower gum line where it meets the lip exposing tooth roots. OMFS was called and accepted transfer.    plan  - OMFS consult

## 2023-03-08 ENCOUNTER — NON-APPOINTMENT (OUTPATIENT)
Age: 68
End: 2023-03-08

## 2023-03-09 ENCOUNTER — APPOINTMENT (OUTPATIENT)
Dept: CARDIOLOGY | Facility: CLINIC | Age: 68
End: 2023-03-09
Payer: MEDICARE

## 2023-03-09 PROBLEM — Z78.9 OTHER SPECIFIED HEALTH STATUS: Chronic | Status: ACTIVE | Noted: 2023-02-22

## 2023-03-09 PROCEDURE — G2066: CPT

## 2023-03-09 PROCEDURE — 93298 REM INTERROG DEV EVAL SCRMS: CPT

## 2023-03-29 ENCOUNTER — NON-APPOINTMENT (OUTPATIENT)
Age: 68
End: 2023-03-29

## 2023-04-12 ENCOUNTER — APPOINTMENT (OUTPATIENT)
Dept: CARDIOLOGY | Facility: CLINIC | Age: 68
End: 2023-04-12
Payer: MEDICARE

## 2023-04-12 VITALS
HEART RATE: 75 BPM | SYSTOLIC BLOOD PRESSURE: 132 MMHG | WEIGHT: 113 LBS | HEIGHT: 65 IN | BODY MASS INDEX: 18.83 KG/M2 | DIASTOLIC BLOOD PRESSURE: 50 MMHG | OXYGEN SATURATION: 97 %

## 2023-04-12 DIAGNOSIS — Z45.09 ENCOUNTER FOR ADJUSTMENT AND MANAGEMENT OF OTHER CARDIAC DEVICE: ICD-10-CM

## 2023-04-12 PROCEDURE — 93285 PRGRMG DEV EVAL SCRMS IP: CPT

## 2023-04-12 NOTE — PROCEDURE
[de-identified] : [employer] Linq II [de-identified] : [employer] Linq II [de-identified] : [id number] [de-identified] : 04/24/2023 [de-identified] : Patient presents today for DVC and WCK.\par \par Battery status good.\par \par ILR site healing well without any s/s of infection. No events.\par \par Device confirmed in [facility] 4/12/23.\par \par F/U: DRCs monthly then in office in 1 year.\par Discussed red flag symptoms, which would warrant sooner or emergent medical evaluation.\par Any questions and concerns were addressed and resolved.\par \par Sincerely,\par Carolina Sanchez [facility]-BC\par Patient's history, testing, and plan was reviewed with supervising physician, Dr. Patrick Valentino

## 2023-05-15 ENCOUNTER — APPOINTMENT (OUTPATIENT)
Dept: CARDIOLOGY | Facility: CLINIC | Age: 68
End: 2023-05-15
Payer: MEDICARE

## 2023-05-15 ENCOUNTER — NON-APPOINTMENT (OUTPATIENT)
Age: 68
End: 2023-05-15

## 2023-05-15 PROCEDURE — G2066: CPT

## 2023-05-15 PROCEDURE — 93298 REM INTERROG DEV EVAL SCRMS: CPT

## 2023-05-17 ENCOUNTER — RX RENEWAL (OUTPATIENT)
Age: 68
End: 2023-05-17

## 2023-06-06 ENCOUNTER — NON-APPOINTMENT (OUTPATIENT)
Age: 68
End: 2023-06-06

## 2023-06-16 ENCOUNTER — APPOINTMENT (OUTPATIENT)
Dept: CARDIOLOGY | Facility: CLINIC | Age: 68
End: 2023-06-16
Payer: MEDICARE

## 2023-06-16 ENCOUNTER — NON-APPOINTMENT (OUTPATIENT)
Age: 68
End: 2023-06-16

## 2023-06-16 PROCEDURE — 93298 REM INTERROG DEV EVAL SCRMS: CPT

## 2023-06-16 PROCEDURE — G2066: CPT

## 2023-07-19 NOTE — ED ADULT TRIAGE NOTE - HEART RATE (BEATS/MIN)
It is important to see your primary physician as well as the physicians noted below within the next week to perform a comprehensive medical review.  Call their offices for an appointment as soon as you leave the hospital.  You will also need to see them for renewal of your medications.  If you do not have a primary physician, contact the [facility] Physician Referral Service at [phone].  To obtain your results, you can access the [facility] Patient Portal at [email or web address].  Your medical issues appear to be stable at this time, but if your symptoms recur or worsen, contact your physicians and/or return to the hospital if necessary.  If you encounter any issues or questions with your medication, call your physicians before stopping the medication.  
71

## 2023-07-21 ENCOUNTER — APPOINTMENT (OUTPATIENT)
Dept: CARDIOLOGY | Facility: CLINIC | Age: 68
End: 2023-07-21
Payer: MEDICARE

## 2023-07-21 ENCOUNTER — NON-APPOINTMENT (OUTPATIENT)
Age: 68
End: 2023-07-21

## 2023-07-21 PROCEDURE — G2066: CPT

## 2023-07-21 PROCEDURE — 93298 REM INTERROG DEV EVAL SCRMS: CPT

## 2023-08-24 ENCOUNTER — APPOINTMENT (OUTPATIENT)
Dept: CARDIOLOGY | Facility: CLINIC | Age: 68
End: 2023-08-24
Payer: MEDICARE

## 2023-08-24 ENCOUNTER — NON-APPOINTMENT (OUTPATIENT)
Age: 68
End: 2023-08-24

## 2023-08-24 PROCEDURE — 93298 REM INTERROG DEV EVAL SCRMS: CPT

## 2023-08-24 PROCEDURE — G2066: CPT

## 2023-09-28 ENCOUNTER — APPOINTMENT (OUTPATIENT)
Dept: CARDIOLOGY | Facility: CLINIC | Age: 68
End: 2023-09-28
Payer: MEDICARE

## 2023-09-28 ENCOUNTER — NON-APPOINTMENT (OUTPATIENT)
Age: 68
End: 2023-09-28

## 2023-09-28 PROCEDURE — 93298 REM INTERROG DEV EVAL SCRMS: CPT

## 2023-09-28 PROCEDURE — G2066: CPT

## 2023-10-06 ENCOUNTER — LABORATORY RESULT (OUTPATIENT)
Age: 68
End: 2023-10-06

## 2023-10-17 ENCOUNTER — NON-APPOINTMENT (OUTPATIENT)
Age: 68
End: 2023-10-17

## 2023-10-17 ENCOUNTER — APPOINTMENT (OUTPATIENT)
Dept: CARDIOLOGY | Facility: CLINIC | Age: 68
End: 2023-10-17
Payer: MEDICARE

## 2023-10-17 VITALS
DIASTOLIC BLOOD PRESSURE: 64 MMHG | SYSTOLIC BLOOD PRESSURE: 110 MMHG | HEIGHT: 65 IN | WEIGHT: 113 LBS | OXYGEN SATURATION: 97 % | BODY MASS INDEX: 18.83 KG/M2 | HEART RATE: 88 BPM

## 2023-10-17 DIAGNOSIS — I70.0 ATHEROSCLEROSIS OF AORTA: ICD-10-CM

## 2023-10-17 DIAGNOSIS — Z71.89 OTHER SPECIFIED COUNSELING: ICD-10-CM

## 2023-10-17 DIAGNOSIS — E78.5 HYPERLIPIDEMIA, UNSPECIFIED: ICD-10-CM

## 2023-10-17 DIAGNOSIS — R55 SYNCOPE AND COLLAPSE: ICD-10-CM

## 2023-10-17 DIAGNOSIS — Z98.890 OTHER SPECIFIED POSTPROCEDURAL STATES: ICD-10-CM

## 2023-10-17 PROCEDURE — 93000 ELECTROCARDIOGRAM COMPLETE: CPT

## 2023-10-17 PROCEDURE — 99213 OFFICE O/P EST LOW 20 MIN: CPT

## 2023-10-31 ENCOUNTER — NON-APPOINTMENT (OUTPATIENT)
Age: 68
End: 2023-10-31

## 2023-10-31 ENCOUNTER — APPOINTMENT (OUTPATIENT)
Dept: CARDIOLOGY | Facility: CLINIC | Age: 68
End: 2023-10-31
Payer: MEDICARE

## 2023-10-31 PROCEDURE — G2066: CPT | Mod: NC

## 2023-10-31 PROCEDURE — 93298 REM INTERROG DEV EVAL SCRMS: CPT

## 2023-12-05 ENCOUNTER — APPOINTMENT (OUTPATIENT)
Dept: CARDIOLOGY | Facility: CLINIC | Age: 68
End: 2023-12-05
Payer: MEDICARE

## 2023-12-05 ENCOUNTER — NON-APPOINTMENT (OUTPATIENT)
Age: 68
End: 2023-12-05

## 2023-12-06 PROCEDURE — G2066: CPT | Mod: NC

## 2023-12-06 PROCEDURE — 93298 REM INTERROG DEV EVAL SCRMS: CPT | Mod: NC

## 2024-01-09 ENCOUNTER — NON-APPOINTMENT (OUTPATIENT)
Age: 69
End: 2024-01-09

## 2024-01-09 ENCOUNTER — APPOINTMENT (OUTPATIENT)
Dept: CARDIOLOGY | Facility: CLINIC | Age: 69
End: 2024-01-09
Payer: MEDICARE

## 2024-01-10 PROCEDURE — 93298 REM INTERROG DEV EVAL SCRMS: CPT

## 2024-02-13 ENCOUNTER — APPOINTMENT (OUTPATIENT)
Dept: CARDIOLOGY | Facility: CLINIC | Age: 69
End: 2024-02-13
Payer: MEDICARE

## 2024-02-13 ENCOUNTER — NON-APPOINTMENT (OUTPATIENT)
Age: 69
End: 2024-02-13

## 2024-02-14 PROCEDURE — 93298 REM INTERROG DEV EVAL SCRMS: CPT

## 2024-03-19 ENCOUNTER — APPOINTMENT (OUTPATIENT)
Dept: CARDIOLOGY | Facility: CLINIC | Age: 69
End: 2024-03-19
Payer: MEDICARE

## 2024-03-19 ENCOUNTER — NON-APPOINTMENT (OUTPATIENT)
Age: 69
End: 2024-03-19

## 2024-03-19 PROCEDURE — 93298 REM INTERROG DEV EVAL SCRMS: CPT

## 2024-04-01 ENCOUNTER — RX RENEWAL (OUTPATIENT)
Age: 69
End: 2024-04-01

## 2024-04-23 ENCOUNTER — NON-APPOINTMENT (OUTPATIENT)
Age: 69
End: 2024-04-23

## 2024-04-23 ENCOUNTER — APPOINTMENT (OUTPATIENT)
Dept: CARDIOLOGY | Facility: CLINIC | Age: 69
End: 2024-04-23
Payer: MEDICARE

## 2024-04-23 PROCEDURE — 93298 REM INTERROG DEV EVAL SCRMS: CPT

## 2024-05-28 ENCOUNTER — APPOINTMENT (OUTPATIENT)
Dept: CARDIOLOGY | Facility: CLINIC | Age: 69
End: 2024-05-28
Payer: MEDICARE

## 2024-05-28 ENCOUNTER — NON-APPOINTMENT (OUTPATIENT)
Age: 69
End: 2024-05-28

## 2024-05-28 PROCEDURE — 93298 REM INTERROG DEV EVAL SCRMS: CPT

## 2024-06-06 ENCOUNTER — APPOINTMENT (OUTPATIENT)
Dept: ULTRASOUND IMAGING | Facility: CLINIC | Age: 69
End: 2024-06-06
Payer: MEDICARE

## 2024-06-06 PROCEDURE — 93971 EXTREMITY STUDY: CPT | Mod: RT

## 2024-07-01 ENCOUNTER — NON-APPOINTMENT (OUTPATIENT)
Age: 69
End: 2024-07-01

## 2024-07-01 ENCOUNTER — APPOINTMENT (OUTPATIENT)
Dept: CARDIOLOGY | Facility: CLINIC | Age: 69
End: 2024-07-01
Payer: MEDICARE

## 2024-07-01 ENCOUNTER — RX RENEWAL (OUTPATIENT)
Age: 69
End: 2024-07-01

## 2024-07-01 PROCEDURE — 93298 REM INTERROG DEV EVAL SCRMS: CPT

## 2024-08-05 ENCOUNTER — NON-APPOINTMENT (OUTPATIENT)
Age: 69
End: 2024-08-05

## 2024-08-05 ENCOUNTER — APPOINTMENT (OUTPATIENT)
Dept: CARDIOLOGY | Facility: CLINIC | Age: 69
End: 2024-08-05

## 2024-08-05 PROCEDURE — 93298 REM INTERROG DEV EVAL SCRMS: CPT

## 2024-09-09 ENCOUNTER — APPOINTMENT (OUTPATIENT)
Dept: CARDIOLOGY | Facility: CLINIC | Age: 69
End: 2024-09-09
Payer: MEDICARE

## 2024-09-09 ENCOUNTER — NON-APPOINTMENT (OUTPATIENT)
Age: 69
End: 2024-09-09

## 2024-09-09 PROCEDURE — 93298 REM INTERROG DEV EVAL SCRMS: CPT

## 2024-09-30 ENCOUNTER — RX RENEWAL (OUTPATIENT)
Age: 69
End: 2024-09-30

## 2024-10-08 ENCOUNTER — NON-APPOINTMENT (OUTPATIENT)
Age: 69
End: 2024-10-08

## 2024-10-08 ENCOUNTER — APPOINTMENT (OUTPATIENT)
Dept: CARDIOLOGY | Facility: CLINIC | Age: 69
End: 2024-10-08
Payer: MEDICARE

## 2024-10-08 VITALS
OXYGEN SATURATION: 97 % | BODY MASS INDEX: 18.99 KG/M2 | WEIGHT: 114 LBS | HEIGHT: 65 IN | HEART RATE: 119 BPM | DIASTOLIC BLOOD PRESSURE: 70 MMHG | SYSTOLIC BLOOD PRESSURE: 128 MMHG

## 2024-10-08 DIAGNOSIS — I48.92 UNSPECIFIED ATRIAL FIBRILLATION: ICD-10-CM

## 2024-10-08 DIAGNOSIS — I70.0 ATHEROSCLEROSIS OF AORTA: ICD-10-CM

## 2024-10-08 DIAGNOSIS — E78.5 HYPERLIPIDEMIA, UNSPECIFIED: ICD-10-CM

## 2024-10-08 DIAGNOSIS — Z98.890 OTHER SPECIFIED POSTPROCEDURAL STATES: ICD-10-CM

## 2024-10-08 DIAGNOSIS — R55 SYNCOPE AND COLLAPSE: ICD-10-CM

## 2024-10-08 DIAGNOSIS — I47.29 OTHER VENTRICULAR TACHYCARDIA: ICD-10-CM

## 2024-10-08 DIAGNOSIS — I47.10 SUPRAVENTRICULAR TACHYCARDIA, UNSPECIFIED: ICD-10-CM

## 2024-10-08 DIAGNOSIS — Z71.89 OTHER SPECIFIED COUNSELING: ICD-10-CM

## 2024-10-08 DIAGNOSIS — I48.91 UNSPECIFIED ATRIAL FIBRILLATION: ICD-10-CM

## 2024-10-08 PROCEDURE — 99215 OFFICE O/P EST HI 40 MIN: CPT

## 2024-10-08 PROCEDURE — 93000 ELECTROCARDIOGRAM COMPLETE: CPT

## 2024-10-08 RX ORDER — APIXABAN 5 MG/1
5 TABLET, FILM COATED ORAL
Qty: 90 | Refills: 1 | Status: ACTIVE | COMMUNITY
Start: 2024-10-08 | End: 1900-01-01

## 2024-10-09 ENCOUNTER — APPOINTMENT (OUTPATIENT)
Dept: CARDIOLOGY | Facility: CLINIC | Age: 69
End: 2024-10-09
Payer: MEDICARE

## 2024-10-09 LAB
ALBUMIN SERPL ELPH-MCNC: 4.4 G/DL
ALP BLD-CCNC: 89 U/L
ALT SERPL-CCNC: 19 U/L
ANION GAP SERPL CALC-SCNC: 12 MMOL/L
AST SERPL-CCNC: 18 U/L
BASOPHILS # BLD AUTO: 0.05 K/UL
BASOPHILS NFR BLD AUTO: 0.9 %
BILIRUB SERPL-MCNC: 1.2 MG/DL
BUN SERPL-MCNC: 14 MG/DL
CALCIUM SERPL-MCNC: 9.4 MG/DL
CHLORIDE SERPL-SCNC: 104 MMOL/L
CHOLEST SERPL-MCNC: 166 MG/DL
CK SERPL-CCNC: 58 U/L
CO2 SERPL-SCNC: 28 MMOL/L
CREAT SERPL-MCNC: 0.67 MG/DL
CRP SERPL HS-MCNC: 0.29 MG/L
EGFR: 93 ML/MIN/1.73M2
EOSINOPHIL # BLD AUTO: 0.06 K/UL
EOSINOPHIL NFR BLD AUTO: 1 %
GLUCOSE SERPL-MCNC: 97 MG/DL
HCT VFR BLD CALC: 39.5 %
HDLC SERPL-MCNC: 76 MG/DL
HGB BLD-MCNC: 12.8 G/DL
IMM GRANULOCYTES NFR BLD AUTO: 0.2 %
LDLC SERPL CALC-MCNC: 79 MG/DL
LYMPHOCYTES # BLD AUTO: 1.9 K/UL
LYMPHOCYTES NFR BLD AUTO: 32.4 %
MAGNESIUM SERPL-MCNC: 2.1 MG/DL
MAN DIFF?: NORMAL
MCHC RBC-ENTMCNC: 31.3 PG
MCHC RBC-ENTMCNC: 32.4 GM/DL
MCV RBC AUTO: 96.6 FL
MONOCYTES # BLD AUTO: 0.43 K/UL
MONOCYTES NFR BLD AUTO: 7.3 %
NEUTROPHILS # BLD AUTO: 3.42 K/UL
NEUTROPHILS NFR BLD AUTO: 58.2 %
NONHDLC SERPL-MCNC: 90 MG/DL
PLATELET # BLD AUTO: 306 K/UL
POTASSIUM SERPL-SCNC: 4.4 MMOL/L
PROT SERPL-MCNC: 6.5 G/DL
RBC # BLD: 4.09 M/UL
RBC # FLD: 13 %
SODIUM SERPL-SCNC: 144 MMOL/L
TRIGL SERPL-MCNC: 55 MG/DL
TSH SERPL-ACNC: 1.42 UIU/ML
WBC # FLD AUTO: 5.87 K/UL

## 2024-10-09 PROCEDURE — 93306 TTE W/DOPPLER COMPLETE: CPT

## 2024-10-09 PROCEDURE — 93978 VASCULAR STUDY: CPT

## 2024-10-10 DIAGNOSIS — R79.89 OTHER SPECIFIED ABNORMAL FINDINGS OF BLOOD CHEMISTRY: ICD-10-CM

## 2024-10-10 LAB
APO LP(A) SERPL-MCNC: <9 NMOL/L
ESTIMATED AVERAGE GLUCOSE: 108 MG/DL
HBA1C MFR BLD HPLC: 5.4 %
NT-PROBNP SERPL-MCNC: 615 PG/ML

## 2024-10-14 ENCOUNTER — APPOINTMENT (OUTPATIENT)
Dept: CARDIOLOGY | Facility: CLINIC | Age: 69
End: 2024-10-14
Payer: MEDICARE

## 2024-10-14 ENCOUNTER — NON-APPOINTMENT (OUTPATIENT)
Age: 69
End: 2024-10-14

## 2024-10-14 PROCEDURE — 93298 REM INTERROG DEV EVAL SCRMS: CPT

## 2024-11-06 ENCOUNTER — NON-APPOINTMENT (OUTPATIENT)
Age: 69
End: 2024-11-06

## 2024-11-06 ENCOUNTER — APPOINTMENT (OUTPATIENT)
Dept: ELECTROPHYSIOLOGY | Facility: CLINIC | Age: 69
End: 2024-11-06
Payer: MEDICARE

## 2024-11-06 ENCOUNTER — APPOINTMENT (OUTPATIENT)
Dept: CARDIOLOGY | Facility: CLINIC | Age: 69
End: 2024-11-06
Payer: MEDICARE

## 2024-11-06 VITALS
HEIGHT: 65 IN | HEART RATE: 73 BPM | WEIGHT: 112 LBS | SYSTOLIC BLOOD PRESSURE: 148 MMHG | BODY MASS INDEX: 18.66 KG/M2 | OXYGEN SATURATION: 98 % | DIASTOLIC BLOOD PRESSURE: 62 MMHG

## 2024-11-06 VITALS
HEIGHT: 65 IN | DIASTOLIC BLOOD PRESSURE: 62 MMHG | HEART RATE: 75 BPM | OXYGEN SATURATION: 98 % | SYSTOLIC BLOOD PRESSURE: 132 MMHG | WEIGHT: 112 LBS | BODY MASS INDEX: 18.66 KG/M2

## 2024-11-06 DIAGNOSIS — Z98.890 OTHER SPECIFIED POSTPROCEDURAL STATES: ICD-10-CM

## 2024-11-06 DIAGNOSIS — I48.91 UNSPECIFIED ATRIAL FIBRILLATION: ICD-10-CM

## 2024-11-06 DIAGNOSIS — I48.92 UNSPECIFIED ATRIAL FIBRILLATION: ICD-10-CM

## 2024-11-06 PROCEDURE — 93000 ELECTROCARDIOGRAM COMPLETE: CPT

## 2024-11-06 PROCEDURE — 99214 OFFICE O/P EST MOD 30 MIN: CPT

## 2024-11-06 PROCEDURE — 99213 OFFICE O/P EST LOW 20 MIN: CPT

## 2024-12-10 ENCOUNTER — NON-APPOINTMENT (OUTPATIENT)
Age: 69
End: 2024-12-10

## 2024-12-10 ENCOUNTER — APPOINTMENT (OUTPATIENT)
Dept: CARDIOLOGY | Facility: CLINIC | Age: 69
End: 2024-12-10
Payer: MEDICARE

## 2024-12-10 PROCEDURE — 93298 REM INTERROG DEV EVAL SCRMS: CPT

## 2024-12-13 ENCOUNTER — APPOINTMENT (OUTPATIENT)
Dept: RADIOLOGY | Facility: CLINIC | Age: 69
End: 2024-12-13
Payer: MEDICARE

## 2024-12-13 PROCEDURE — 77080 DXA BONE DENSITY AXIAL: CPT

## 2024-12-30 ENCOUNTER — RX RENEWAL (OUTPATIENT)
Age: 69
End: 2024-12-30

## 2025-01-14 ENCOUNTER — NON-APPOINTMENT (OUTPATIENT)
Age: 70
End: 2025-01-14

## 2025-01-14 ENCOUNTER — APPOINTMENT (OUTPATIENT)
Dept: CARDIOLOGY | Facility: CLINIC | Age: 70
End: 2025-01-14
Payer: MEDICARE

## 2025-01-14 PROCEDURE — 93298 REM INTERROG DEV EVAL SCRMS: CPT

## 2025-02-18 ENCOUNTER — NON-APPOINTMENT (OUTPATIENT)
Age: 70
End: 2025-02-18

## 2025-02-18 ENCOUNTER — APPOINTMENT (OUTPATIENT)
Dept: CARDIOLOGY | Facility: CLINIC | Age: 70
End: 2025-02-18
Payer: MEDICARE

## 2025-02-18 PROCEDURE — 93298 REM INTERROG DEV EVAL SCRMS: CPT

## 2025-03-04 ENCOUNTER — APPOINTMENT (OUTPATIENT)
Dept: CARDIOLOGY | Facility: CLINIC | Age: 70
End: 2025-03-04
Payer: MEDICARE

## 2025-03-04 VITALS
OXYGEN SATURATION: 99 % | BODY MASS INDEX: 19.14 KG/M2 | DIASTOLIC BLOOD PRESSURE: 72 MMHG | SYSTOLIC BLOOD PRESSURE: 142 MMHG | HEART RATE: 72 BPM | WEIGHT: 115 LBS

## 2025-03-04 VITALS — SYSTOLIC BLOOD PRESSURE: 154 MMHG | DIASTOLIC BLOOD PRESSURE: 74 MMHG

## 2025-03-04 DIAGNOSIS — R07.89 OTHER CHEST PAIN: ICD-10-CM

## 2025-03-04 DIAGNOSIS — Z87.898 PERSONAL HISTORY OF OTHER SPECIFIED CONDITIONS: ICD-10-CM

## 2025-03-04 PROCEDURE — 99214 OFFICE O/P EST MOD 30 MIN: CPT

## 2025-03-05 ENCOUNTER — NON-APPOINTMENT (OUTPATIENT)
Age: 70
End: 2025-03-05

## 2025-03-06 ENCOUNTER — APPOINTMENT (OUTPATIENT)
Dept: CARDIOLOGY | Facility: CLINIC | Age: 70
End: 2025-03-06
Payer: MEDICARE

## 2025-03-06 PROCEDURE — 93015 CV STRESS TEST SUPVJ I&R: CPT

## 2025-03-06 PROCEDURE — A9502: CPT | Mod: JZ

## 2025-03-06 PROCEDURE — 93880 EXTRACRANIAL BILAT STUDY: CPT

## 2025-03-06 PROCEDURE — 78452 HT MUSCLE IMAGE SPECT MULT: CPT

## 2025-03-06 PROCEDURE — 93306 TTE W/DOPPLER COMPLETE: CPT

## 2025-03-14 ENCOUNTER — APPOINTMENT (OUTPATIENT)
Dept: CARDIOLOGY | Facility: CLINIC | Age: 70
End: 2025-03-14
Payer: MEDICARE

## 2025-03-14 VITALS
DIASTOLIC BLOOD PRESSURE: 68 MMHG | BODY MASS INDEX: 19.16 KG/M2 | HEART RATE: 86 BPM | HEIGHT: 65 IN | OXYGEN SATURATION: 98 % | WEIGHT: 115 LBS | SYSTOLIC BLOOD PRESSURE: 122 MMHG

## 2025-03-14 DIAGNOSIS — Z98.890 OTHER SPECIFIED POSTPROCEDURAL STATES: ICD-10-CM

## 2025-03-14 DIAGNOSIS — R94.31 ABNORMAL ELECTROCARDIOGRAM [ECG] [EKG]: ICD-10-CM

## 2025-03-14 DIAGNOSIS — E78.5 HYPERLIPIDEMIA, UNSPECIFIED: ICD-10-CM

## 2025-03-14 DIAGNOSIS — I48.92 UNSPECIFIED ATRIAL FIBRILLATION: ICD-10-CM

## 2025-03-14 DIAGNOSIS — I48.91 UNSPECIFIED ATRIAL FIBRILLATION: ICD-10-CM

## 2025-03-14 DIAGNOSIS — Z71.89 OTHER SPECIFIED COUNSELING: ICD-10-CM

## 2025-03-14 PROCEDURE — 99214 OFFICE O/P EST MOD 30 MIN: CPT

## 2025-03-14 RX ORDER — METOPROLOL SUCCINATE 25 MG/1
25 TABLET, EXTENDED RELEASE ORAL DAILY
Qty: 90 | Refills: 1 | Status: ACTIVE | COMMUNITY
Start: 2025-03-14 | End: 1900-01-01

## 2025-03-14 RX ORDER — UBIDECARENONE/VIT E ACET 100MG-5
CAPSULE ORAL
Refills: 0 | Status: ACTIVE | COMMUNITY

## 2025-03-24 ENCOUNTER — NON-APPOINTMENT (OUTPATIENT)
Age: 70
End: 2025-03-24

## 2025-03-24 ENCOUNTER — APPOINTMENT (OUTPATIENT)
Dept: CARDIOLOGY | Facility: CLINIC | Age: 70
End: 2025-03-24
Payer: MEDICARE

## 2025-03-24 PROCEDURE — 93298 REM INTERROG DEV EVAL SCRMS: CPT

## 2025-04-01 ENCOUNTER — RX RENEWAL (OUTPATIENT)
Age: 70
End: 2025-04-01

## 2025-04-28 ENCOUNTER — NON-APPOINTMENT (OUTPATIENT)
Age: 70
End: 2025-04-28

## 2025-04-28 ENCOUNTER — APPOINTMENT (OUTPATIENT)
Dept: CARDIOLOGY | Facility: CLINIC | Age: 70
End: 2025-04-28
Payer: MEDICARE

## 2025-04-28 PROCEDURE — 93298 REM INTERROG DEV EVAL SCRMS: CPT

## 2025-06-02 ENCOUNTER — APPOINTMENT (OUTPATIENT)
Dept: CARDIOLOGY | Facility: CLINIC | Age: 70
End: 2025-06-02
Payer: MEDICARE

## 2025-06-02 ENCOUNTER — NON-APPOINTMENT (OUTPATIENT)
Age: 70
End: 2025-06-02

## 2025-06-02 PROCEDURE — 93298 REM INTERROG DEV EVAL SCRMS: CPT

## 2025-06-13 ENCOUNTER — APPOINTMENT (OUTPATIENT)
Dept: CARDIOLOGY | Facility: CLINIC | Age: 70
End: 2025-06-13
Payer: MEDICARE

## 2025-06-13 VITALS
DIASTOLIC BLOOD PRESSURE: 60 MMHG | HEIGHT: 65 IN | HEART RATE: 62 BPM | WEIGHT: 114 LBS | OXYGEN SATURATION: 100 % | BODY MASS INDEX: 18.99 KG/M2 | SYSTOLIC BLOOD PRESSURE: 122 MMHG

## 2025-06-13 PROCEDURE — 99214 OFFICE O/P EST MOD 30 MIN: CPT

## 2025-06-30 ENCOUNTER — RX RENEWAL (OUTPATIENT)
Age: 70
End: 2025-06-30

## 2025-07-03 ENCOUNTER — NON-APPOINTMENT (OUTPATIENT)
Age: 70
End: 2025-07-03

## 2025-07-03 ENCOUNTER — APPOINTMENT (OUTPATIENT)
Dept: CARDIOLOGY | Facility: CLINIC | Age: 70
End: 2025-07-03

## 2025-07-03 PROCEDURE — 93298 REM INTERROG DEV EVAL SCRMS: CPT

## 2025-08-07 ENCOUNTER — NON-APPOINTMENT (OUTPATIENT)
Age: 70
End: 2025-08-07

## 2025-08-07 ENCOUNTER — APPOINTMENT (OUTPATIENT)
Dept: CARDIOLOGY | Facility: CLINIC | Age: 70
End: 2025-08-07

## 2025-08-07 PROCEDURE — 93298 REM INTERROG DEV EVAL SCRMS: CPT

## 2025-08-27 ENCOUNTER — APPOINTMENT (OUTPATIENT)
Dept: CARDIOLOGY | Facility: CLINIC | Age: 70
End: 2025-08-27
Payer: MEDICARE

## 2025-08-27 VITALS
BODY MASS INDEX: 19.16 KG/M2 | DIASTOLIC BLOOD PRESSURE: 62 MMHG | HEART RATE: 71 BPM | HEIGHT: 65 IN | OXYGEN SATURATION: 97 % | WEIGHT: 115 LBS | SYSTOLIC BLOOD PRESSURE: 116 MMHG

## 2025-08-27 DIAGNOSIS — E78.5 HYPERLIPIDEMIA, UNSPECIFIED: ICD-10-CM

## 2025-08-27 DIAGNOSIS — I48.91 UNSPECIFIED ATRIAL FIBRILLATION: ICD-10-CM

## 2025-08-27 DIAGNOSIS — Z01.810 ENCOUNTER FOR PREPROCEDURAL CARDIOVASCULAR EXAMINATION: ICD-10-CM

## 2025-08-27 DIAGNOSIS — I48.92 UNSPECIFIED ATRIAL FIBRILLATION: ICD-10-CM

## 2025-08-27 PROCEDURE — 99214 OFFICE O/P EST MOD 30 MIN: CPT

## 2025-08-27 PROCEDURE — 93000 ELECTROCARDIOGRAM COMPLETE: CPT

## 2025-08-29 PROBLEM — Z01.810 PREOP CARDIOVASCULAR EXAM: Status: ACTIVE | Noted: 2025-08-29
